# Patient Record
Sex: FEMALE | Race: WHITE | NOT HISPANIC OR LATINO | Employment: FULL TIME | URBAN - METROPOLITAN AREA
[De-identification: names, ages, dates, MRNs, and addresses within clinical notes are randomized per-mention and may not be internally consistent; named-entity substitution may affect disease eponyms.]

---

## 2017-01-25 ENCOUNTER — PATIENT MESSAGE (OUTPATIENT)
Dept: INTERNAL MEDICINE | Facility: CLINIC | Age: 47
End: 2017-01-25

## 2017-01-26 ENCOUNTER — PATIENT MESSAGE (OUTPATIENT)
Dept: INTERNAL MEDICINE | Facility: CLINIC | Age: 47
End: 2017-01-26

## 2017-01-26 DIAGNOSIS — Z00.00 ANNUAL PHYSICAL EXAM: Primary | ICD-10-CM

## 2017-01-31 ENCOUNTER — LAB VISIT (OUTPATIENT)
Dept: LAB | Facility: HOSPITAL | Age: 47
End: 2017-01-31
Payer: COMMERCIAL

## 2017-01-31 DIAGNOSIS — Z00.00 ANNUAL PHYSICAL EXAM: ICD-10-CM

## 2017-01-31 LAB
25(OH)D3+25(OH)D2 SERPL-MCNC: 72 NG/ML
ALBUMIN SERPL BCP-MCNC: 4 G/DL
ALP SERPL-CCNC: 46 U/L
ALT SERPL W/O P-5'-P-CCNC: 16 U/L
ANION GAP SERPL CALC-SCNC: 6 MMOL/L
AST SERPL-CCNC: 20 U/L
BASOPHILS # BLD AUTO: 0.04 K/UL
BASOPHILS NFR BLD: 1.3 %
BILIRUB SERPL-MCNC: 0.5 MG/DL
BUN SERPL-MCNC: 12 MG/DL
CALCIUM SERPL-MCNC: 9.5 MG/DL
CHLORIDE SERPL-SCNC: 106 MMOL/L
CHOLEST/HDLC SERPL: 4.1 {RATIO}
CO2 SERPL-SCNC: 26 MMOL/L
CREAT SERPL-MCNC: 0.7 MG/DL
DIFFERENTIAL METHOD: ABNORMAL
EOSINOPHIL # BLD AUTO: 0.1 K/UL
EOSINOPHIL NFR BLD: 2.2 %
ERYTHROCYTE [DISTWIDTH] IN BLOOD BY AUTOMATED COUNT: 12.8 %
EST. GFR  (AFRICAN AMERICAN): >60 ML/MIN/1.73 M^2
EST. GFR  (NON AFRICAN AMERICAN): >60 ML/MIN/1.73 M^2
GLUCOSE SERPL-MCNC: 82 MG/DL
HCT VFR BLD AUTO: 38.6 %
HDL/CHOLESTEROL RATIO: 24.6 %
HDLC SERPL-MCNC: 187 MG/DL
HDLC SERPL-MCNC: 46 MG/DL
HGB BLD-MCNC: 12.6 G/DL
LDLC SERPL CALC-MCNC: 131.2 MG/DL
LYMPHOCYTES # BLD AUTO: 1.2 K/UL
LYMPHOCYTES NFR BLD: 36.4 %
MCH RBC QN AUTO: 30.6 PG
MCHC RBC AUTO-ENTMCNC: 32.6 %
MCV RBC AUTO: 94 FL
MONOCYTES # BLD AUTO: 0.3 K/UL
MONOCYTES NFR BLD: 8.5 %
NEUTROPHILS # BLD AUTO: 1.6 K/UL
NEUTROPHILS NFR BLD: 51.6 %
NONHDLC SERPL-MCNC: 141 MG/DL
PLATELET # BLD AUTO: 192 K/UL
PMV BLD AUTO: 11.5 FL
POTASSIUM SERPL-SCNC: 4 MMOL/L
PROT SERPL-MCNC: 7.3 G/DL
RBC # BLD AUTO: 4.12 M/UL
SODIUM SERPL-SCNC: 138 MMOL/L
TRIGL SERPL-MCNC: 49 MG/DL
WBC # BLD AUTO: 3.16 K/UL

## 2017-01-31 PROCEDURE — 80061 LIPID PANEL: CPT

## 2017-01-31 PROCEDURE — 80053 COMPREHEN METABOLIC PANEL: CPT

## 2017-01-31 PROCEDURE — 85025 COMPLETE CBC W/AUTO DIFF WBC: CPT

## 2017-01-31 PROCEDURE — 82306 VITAMIN D 25 HYDROXY: CPT

## 2017-01-31 PROCEDURE — 36415 COLL VENOUS BLD VENIPUNCTURE: CPT

## 2017-02-07 ENCOUNTER — OFFICE VISIT (OUTPATIENT)
Dept: INTERNAL MEDICINE | Facility: CLINIC | Age: 47
End: 2017-02-07
Payer: COMMERCIAL

## 2017-02-07 VITALS
HEIGHT: 65 IN | HEART RATE: 73 BPM | WEIGHT: 115.75 LBS | DIASTOLIC BLOOD PRESSURE: 67 MMHG | SYSTOLIC BLOOD PRESSURE: 102 MMHG | BODY MASS INDEX: 19.28 KG/M2

## 2017-02-07 DIAGNOSIS — Z00.00 ANNUAL PHYSICAL EXAM: Primary | ICD-10-CM

## 2017-02-07 PROCEDURE — 99212 OFFICE O/P EST SF 10 MIN: CPT | Mod: S$GLB,,, | Performed by: INTERNAL MEDICINE

## 2017-02-07 PROCEDURE — 99999 PR PBB SHADOW E&M-EST. PATIENT-LVL II: CPT | Mod: PBBFAC,,, | Performed by: INTERNAL MEDICINE

## 2017-02-07 NOTE — PROGRESS NOTES
Dione Egan 46 y.o. female is here for office visit to review care and physical exam, I have personally taken the history and examined Doine Egan and agree with the findings, assessment and plan.

## 2017-02-07 NOTE — PROGRESS NOTES
Subjective:       Patient ID: Dione Egan is a 46 y.o. female.    Chief Complaint: Annual Exam     HPI     Patient is a 47 yo female who presents to clinic today for annual exam. During last visit patient had been having symptoms of depression 2/2 divorce. However she began having night sweats due to the medications and has been off of everything for the last 6 months. She states she is doing well without the medications. She is feeling well today with no complaints.     Health maintenance:   -hx of COURTNEY II. Has follow up with Dr. Kinney in July 2017.  -mammogram in 2015 was negative. She wishes to have mammograms every two years. Will order in 2018.  -Has history of familial hypercholesterolemia in her mother. Lipid panel from 1/31 showed LDL of 131 and HDL 46.     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 Recently . Does not smoke. Social drinker.     Family hx: Prostate CA in father. HLD in mother.     Past Surgical History   Procedure Laterality Date    Cervical con[facility]   w/ laser  2006     [facility]    Intrauterine device insertion  10/2015     [facility]    Cervical biopsy  w/ loop electrode excision  2016     [facility]---COURTNEY II       Review of Systems   Constitutional: Negative for chills, fatigue and fever.   HENT: Negative for facial swelling.    Eyes: Positive for photophobia. Negative for visual disturbance.   Respiratory: Negative for cough, shortness of breath and wheezing.    Cardiovascular: Negative for chest pain, palpitations and leg swelling.   Genitourinary: Negative for dysuria, hematuria and urgency.   Musculoskeletal: Negative for arthralgias and myalgias.   Skin: Negative for color change and pallor.   Allergic/Immunologic: Negative for immunocompromised state.   Neurological: Negative for dizziness, syncope, light-headedness and headaches.   Hematological: Negative for adenopathy. Does not bruise/bleed easily.   Psychiatric/Behavioral: Negative for agitation and behavioral problems. The  patient is not nervous/anxious.        Objective:      Physical Exam   Constitutional: She is oriented to person, place, and time. She appears well-developed and well-nourished.   HENT:   Head: Normocephalic and atraumatic.   Eyes: EOM are normal. Pupils are equal, round, and reactive to light.   Neck: Normal range of motion. Neck supple.   Cardiovascular: Normal rate and regular rhythm.  Exam reveals no friction rub.    No murmur heard.  Pulmonary/Chest: Effort normal and breath sounds normal. No respiratory distress. She has no wheezes.   Abdominal: Soft. She exhibits no distension. There is no tenderness.   Musculoskeletal: Normal range of motion. She exhibits no edema.   Neurological: She is alert and oriented to person, place, and time. No cranial nerve deficit.   Skin: Skin is warm and dry. No erythema.   Psychiatric: She has a normal mood and affect. Her behavior is normal.       Assessment:       1. Annual physical exam        Plan:       Dione was seen today for annual exam.       Annual physical exam  -hx of COURTNEY II. Has follow up with Dr. Kinney in July 2017.  -mammogram in 2015 was negative. She wishes to have mammograms every two years. Will order in 2018.  -Has history of familial hypercholesterolemia in her mother. Lipid panel from 1/31 showed LDL of 131 and HDL 46.     RTC in 1 year.     Discussed with Dr. Andrews.     Chelsea Diamond MD PGY-2

## 2017-05-15 ENCOUNTER — OFFICE VISIT (OUTPATIENT)
Dept: DERMATOLOGY | Facility: CLINIC | Age: 47
End: 2017-05-15
Payer: COMMERCIAL

## 2017-05-15 VITALS — BODY MASS INDEX: 19.14 KG/M2 | WEIGHT: 115 LBS

## 2017-05-15 DIAGNOSIS — D22.9 NEVUS OF MULTIPLE SITES: Primary | ICD-10-CM

## 2017-05-15 DIAGNOSIS — L21.9 SEBORRHEIC DERMATITIS: ICD-10-CM

## 2017-05-15 PROCEDURE — 99214 OFFICE O/P EST MOD 30 MIN: CPT | Mod: S$GLB,,, | Performed by: DERMATOLOGY

## 2017-05-15 PROCEDURE — 99999 PR PBB SHADOW E&M-EST. PATIENT-LVL II: CPT | Mod: PBBFAC,,, | Performed by: DERMATOLOGY

## 2017-05-15 NOTE — MR AVS SNAPSHOT
[facility]   [facility]  [place] [address]  Phone: [phone]  Fax: [phone]                  Dione Egan   5/15/2017 8:40 AM   Office Visit    Description:  Female : 1970   Provider:  Cynthia Ingram MD   Department:  [facility]           Reason for Visit     Spot     Skin Check           Diagnoses this Visit        Comments    Nevus of multiple sites    -  Primary     Seborrheic dermatitis                To Do List           Goals (5 Years of Data)     None      Follow-Up and Disposition     Return in about 1 year (around 5/15/2018).      [facility] On Call     [facility] On Call Nurse Care Line -  Assistance  Unless otherwise directed by your provider, please contact [facility], our nurse care line that is available for  assistance.     Registered nurses in the [facility] On Call Center provide: appointment scheduling, clinical advisement, health education, and other advisory services.  Call: [phone] (toll free)               Medications                Verify that the below list of medications is an accurate representation of the medications you are currently taking.  If none reported, the list may be blank. If incorrect, please contact your healthcare provider. Carry this list with you in case of emergency.           Current Medications     citalopram (CELEXA) 20 MG tablet Take 1 tablet (20 mg total) by mouth once daily.    escitalopram oxalate (LEXAPRO) 10 MG tablet Take 1 tablet (10 mg total) by mouth once daily.    tinidazole (TINDAMAX) 500 MG tablet 4 PILLS A DAY FOR 2 DAYS    trazodone (DESYREL) 50 MG tablet Take 1 tablet (50 mg total) by mouth every evening.    zolpidem (AMBIEN) 5 MG Tab Take 1 tablet (5 mg total) by mouth nightly as needed.           Clinical Reference Information           Your Vitals Were     Weight BMI             52.2 kg (115 lb) 19.14 kg/m2         Allergies as of 5/15/2017     Vicodin  [Hydrocodone-acetaminophen]      Immunizations Administered on Date of Encounter - 5/15/2017     None      Language Assistance Services     ATTENTION: Language assistance services are available, free of charge. Please call [phone].      ATENCIÓN: Si dionicio pan, tiene a servin disposición servicios gratuitos de asistencia lingüística. Llame al [phone].     [facility] Ý: N?u b?n nói Ti?ng Vi?t, có các d?ch v? h? tr? ngôn ng? mi?n phí dành cho b?n. G?i s? [phone].         [facility] complies with applicable Federal civil rights laws and does not discriminate on the basis of race, color, national origin, age, disability, or sex.

## 2017-05-15 NOTE — PROGRESS NOTES
"  Subjective:       Patient ID:  Dione Egan is a 46 y.o. female who presents for   Chief Complaint   Patient presents with    Spot    Skin Check     TBSE     HPI Comments: History of Present Illness: The patient presents with chief complaint of moles.  Location: total body check  Duration: years  Signs/Symptoms: none    Prior treatments: none    Also some pigment under left eye for over a year following picking at a lesion.  Her seborrheic dermatitis has improved (see previous note).      Spot         Review of Systems   Constitutional: Negative for fever.   Skin: Negative for itching and rash.   Hematologic/Lymphatic: Does not bruise/bleed easily.        Objective:    Physical Exam   Constitutional: She appears well-developed and well-nourished. No distress.   Neurological: She is alert and oriented to person, place, and time. She is not disoriented.   Psychiatric: She has a normal mood and affect.   Skin:   Areas Examined (abnormalities noted in diagram):   Scalp / Hair Palpated and Inspected  Head / Face Inspection Performed  Neck Inspection Performed  Chest / Axilla Inspection Performed  Abdomen Inspection Performed  Genitals / Buttocks / Groin Inspection Performed  Back Inspection Performed  RUE Inspected  LUE Inspection Performed  RLE Inspected  LLE Inspection Performed  Nails and Digits Inspection Performed                   Diagram Legend      Erythematous eczematous patch      See annotation      Assessment / Plan:        Nevus of multiple sites  Brochure provided  The "ABCD" rules to observe pigmented lesions were reviewed.      Seborrheic dermatitis  elidel cream prn    Post inflammatory hyperpigmentation  differin           Return in about 1 year (around 5/15/2018).  "

## 2018-11-14 ENCOUNTER — TELEPHONE (OUTPATIENT)
Dept: OPHTHALMOLOGY | Facility: CLINIC | Age: 48
End: 2018-11-14

## 2018-11-14 NOTE — TELEPHONE ENCOUNTER
----- Message from Mari Montero sent at 11/14/2018  9:36 AM CST -----  Contact: Dione Egan   Pt would like to speak with  nurse to please to scheduled appointment,pt can be reached at ext [phone] or cell# [phone] please thank you.

## 2018-12-17 ENCOUNTER — OFFICE VISIT (OUTPATIENT)
Dept: INTERNAL MEDICINE | Facility: CLINIC | Age: 48
End: 2018-12-17
Payer: COMMERCIAL

## 2018-12-17 VITALS
DIASTOLIC BLOOD PRESSURE: 72 MMHG | HEART RATE: 60 BPM | WEIGHT: 113.13 LBS | SYSTOLIC BLOOD PRESSURE: 112 MMHG | BODY MASS INDEX: 18.82 KG/M2

## 2018-12-17 DIAGNOSIS — Z00.00 ANNUAL PHYSICAL EXAM: Primary | ICD-10-CM

## 2018-12-17 DIAGNOSIS — E55.9 VITAMIN D DEFICIENCY: ICD-10-CM

## 2018-12-17 DIAGNOSIS — E78.01 FAMILIAL HYPERCHOLESTEREMIA: ICD-10-CM

## 2018-12-17 PROCEDURE — 3008F BODY MASS INDEX DOCD: CPT | Mod: CPTII,S$GLB,, | Performed by: INTERNAL MEDICINE

## 2018-12-17 PROCEDURE — 99213 OFFICE O/P EST LOW 20 MIN: CPT | Mod: S$GLB,,, | Performed by: INTERNAL MEDICINE

## 2018-12-17 PROCEDURE — 99999 PR PBB SHADOW E&M-EST. PATIENT-LVL III: CPT | Mod: PBBFAC,,, | Performed by: INTERNAL MEDICINE

## 2018-12-17 RX ORDER — ERGOCALCIFEROL (VITAMIN D2) 200 MCG/ML
2000 DROPS ORAL DAILY
COMMUNITY
End: 2022-06-27

## 2018-12-17 NOTE — PROGRESS NOTES
"Subjective:       Patient ID: Dione Egan is a 48 y.o. female.    Chief Complaint: No chief complaint on file.    HPI   48 year old woman with history of cervical dysplasia s/p colposcopy (2015) who presents to clinic for annual physical. She has been in general good state of health. She has taken 3000 units daily of vit D for the past 5 years for vit D deficiency. She has family history of high cholesterol and takes omega 3 and "cholestoff" supplements over the counter. Diet is mostly a mediterrean diet with several fruits and veggies. She does not eat pork or beef. She is physical active with daily walks.     She has not seen her ObGyn since 2016 but plans on making a follow-up appointment.     FH: prostate cancer (father), breast cancer (paternal aunt)  Social: No smoking, drinks 1 glass of wine every other day, no drug use; works as clinical research coordinator in oncology       Review of Systems   Constitutional: Negative for chills and fever.   HENT: Negative for sore throat.    Respiratory: Negative for shortness of breath.    Cardiovascular: Negative for chest pain.   Gastrointestinal: Negative for abdominal pain.   Genitourinary: Negative for dysuria.       Objective:      Physical Exam   Constitutional: She is oriented to person, place, and time. She appears well-developed and well-nourished.   HENT:   Right Ear: External ear normal.   Left Ear: External ear normal.   Eyes: EOM are normal.   Neck: Neck supple. No thyromegaly present.   Cardiovascular: Normal rate, regular rhythm, normal heart sounds and intact distal pulses.   Pulmonary/Chest: Effort normal and breath sounds normal.   Abdominal: Soft. Bowel sounds are normal. She exhibits no distension. There is no tenderness.   Musculoskeletal: She exhibits no edema.   Lymphadenopathy:     She has no cervical adenopathy.   Neurological: She is alert and oriented to person, place, and time.   Skin: Skin is warm and dry. Capillary refill takes less than 2 " seconds.       Assessment:       1. Annual physical exam    2. Familial hypercholesteremia    3. Vitamin D deficiency        Plan:       Diagnoses and all orders for this visit:    Annual physical exam  -     Lipid panel; Future  -     Mammo Digital Screening Bilateral With CAD; Future    Familial hypercholesteremia    Vitamin D deficiency    Continue Vit D2 2000 units daily.     RTC in 1 year. Plan discussed withy Dr. Taylor.    Reggie Eugene DO  PGY3 Internal Medicine  Pager: [phone]

## 2018-12-18 ENCOUNTER — HOSPITAL ENCOUNTER (OUTPATIENT)
Dept: RADIOLOGY | Facility: HOSPITAL | Age: 48
Discharge: HOME | End: 2018-12-18
Attending: INTERNAL MEDICINE
Payer: COMMERCIAL

## 2018-12-18 DIAGNOSIS — Z00.00 ANNUAL PHYSICAL EXAM: ICD-10-CM

## 2018-12-18 DIAGNOSIS — Z12.39 SCREENING FOR BREAST CANCER: ICD-10-CM

## 2018-12-18 PROCEDURE — 77063 BREAST TOMOSYNTHESIS BI: CPT | Mod: TC

## 2018-12-18 PROCEDURE — 77067 SCR MAMMO BI INCL CAD: CPT | Mod: TC

## 2018-12-18 PROCEDURE — 77067 SCR MAMMO BI INCL CAD: CPT | Mod: 26,,, | Performed by: RADIOLOGY

## 2018-12-18 PROCEDURE — 77063 BREAST TOMOSYNTHESIS BI: CPT | Mod: 26,,, | Performed by: RADIOLOGY

## 2018-12-19 ENCOUNTER — INITIAL CONSULT (OUTPATIENT)
Dept: OPHTHALMOLOGY | Facility: CLINIC | Age: 48
End: 2018-12-19
Payer: COMMERCIAL

## 2018-12-19 DIAGNOSIS — M35.01 KERATITIS SICCA: Primary | ICD-10-CM

## 2018-12-19 DIAGNOSIS — H52.03 HYPERMETROPIA OF BOTH EYES: ICD-10-CM

## 2018-12-19 DIAGNOSIS — H52.4 PRESBYOPIA: ICD-10-CM

## 2018-12-19 PROCEDURE — 99999 PR PBB SHADOW E&M-EST. PATIENT-LVL III: CPT | Mod: PBBFAC,,, | Performed by: OPHTHALMOLOGY

## 2018-12-19 PROCEDURE — 92012 INTRM OPH EXAM EST PATIENT: CPT | Mod: S$GLB,,, | Performed by: OPHTHALMOLOGY

## 2018-12-19 NOTE — PROGRESS NOTES
HPI     DLS: 02/05/2016 Dr. Burton    Patient states when she puts on OTC readers she is able to see st a   distance and near. She wants to know what her different options are but is   not interested in LASIK. Denies flashes, floaters, diplopia, photophobia,   glare, HA's, or pain.    Last edited by Yue Garcia on 12/19/2018  3:03 PM. (History)            Assessment /Plan     For exam results, see Encounter Report.    Keratitis sicca    Hypermetropia of both eyes    Presbyopia      ATs for ELKE  OTC readers OK
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
## 2019-12-20 ENCOUNTER — HOSPITAL ENCOUNTER (OUTPATIENT)
Dept: RADIOLOGY | Facility: HOSPITAL | Age: 49
Discharge: HOME | End: 2019-12-20
Attending: NURSE PRACTITIONER
Payer: COMMERCIAL
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
## 2019-12-20 PROCEDURE — 73130 X-RAY EXAM OF HAND: CPT | Mod: TC,LT

## 2019-12-20 PROCEDURE — 73130 XR HAND COMPLETE 3 VIEW LEFT: ICD-10-PCS | Mod: 26,LT,, | Performed by: RADIOLOGY

## 2019-12-20 PROCEDURE — 73130 X-RAY EXAM OF HAND: CPT | Mod: 26,LT,, | Performed by: RADIOLOGY

## 2020-03-10 ENCOUNTER — OFFICE VISIT (OUTPATIENT)
Dept: OBSTETRICS AND GYNECOLOGY | Facility: CLINIC | Age: 50
End: 2020-03-10
Payer: COMMERCIAL

## 2020-03-10 VITALS — WEIGHT: 112 LBS | HEIGHT: 65 IN | BODY MASS INDEX: 18.66 KG/M2

## 2020-03-10 DIAGNOSIS — N88.2 CERVICAL OS STENOSIS: ICD-10-CM

## 2020-03-10 DIAGNOSIS — Z12.31 SCREENING MAMMOGRAM, ENCOUNTER FOR: ICD-10-CM

## 2020-03-10 DIAGNOSIS — Z01.419 ENCOUNTER FOR GYNECOLOGICAL EXAMINATION WITHOUT ABNORMAL FINDING: Primary | ICD-10-CM

## 2020-03-10 PROBLEM — Z30.432 ENCOUNTER FOR IUD REMOVAL: Status: ACTIVE | Noted: 2020-03-10

## 2020-03-10 PROBLEM — Z30.432 ENCOUNTER FOR IUD REMOVAL: Status: RESOLVED | Noted: 2020-03-10 | Resolved: 2020-03-10

## 2020-03-10 PROCEDURE — 99999 PR PBB SHADOW E&M-EST. PATIENT-LVL III: CPT | Mod: PBBFAC,,, | Performed by: OBSTETRICS & GYNECOLOGY

## 2020-03-10 PROCEDURE — 99386 PREV VISIT NEW AGE 40-64: CPT | Mod: S$GLB,,, | Performed by: OBSTETRICS & GYNECOLOGY

## 2020-03-10 PROCEDURE — 99386 PR PREVENTIVE VISIT,NEW,40-64: ICD-10-PCS | Mod: S$GLB,,, | Performed by: OBSTETRICS & GYNECOLOGY

## 2020-03-10 PROCEDURE — 99999 PR PBB SHADOW E&M-EST. PATIENT-LVL III: ICD-10-PCS | Mod: PBBFAC,,, | Performed by: OBSTETRICS & GYNECOLOGY

## 2020-03-10 PROCEDURE — 88175 CYTOPATH C/V AUTO FLUID REDO: CPT

## 2020-03-10 RX ORDER — MISOPROSTOL 200 UG/1
TABLET ORAL
Qty: 3 TABLET | Refills: 0 | Status: SHIPPED | OUTPATIENT
Start: 2020-03-10 | End: 2020-05-26 | Stop reason: SDUPTHER

## 2020-03-10 NOTE — PROGRESS NOTES
03/13/20 U/S  Uterus:  Size: 9.4 x 4.1 x 5.7 cm  Masses: None  Endometrium: Normal in this pre menopausal patient, measuring 2 mm.  Endometrial canal is not distended.  Intrauterine device is in good position.  Right ovary:  Size: 3.4 x 1.8 x 2.4 cm  Appearance: Normal  Vascular flow: Normal.  Left ovary:  Size: 3.5 x 2.1 x 3.2 cm  Appearance: A nonspecific focal area of hyperechogenicity is identified in the left ovary, measuring 0.9 x 0.6 x 0.7 cm.  Vascular Flow: Normal.  Free Fluid:  None.  Others:  Nabothian cysts are identified at the cervix.  Intrauterine device is in satisfactory position.      Impression     1. Subcentimeter hyperechoic lesion in the left ovary, possibly a small dermoid cyst.  Consider correlation with any outside imaging if available.  2. Intrauterine device is in appropriate position.  3. Nabothian cysts are identified at the cervix.         PT HERE FOR ANNUAL.  WANTS IUD REMOVED.    ROS:  GENERAL: No fever, chills, fatigability or weight loss.  VULVAR: No pain, no lesions and no itching.  VAGINAL: No relaxation, no itching, no discharge, no abnormal bleeding and no lesions.  ABDOMEN: No abdominal pain. Denies nausea. Denies vomiting. No diarrhea. No constipation  BREAST: Denies pain. No lumps. No discharge.  URINARY: No incontinence, no nocturia, no frequency and no dysuria.  CARDIOVASCULAR: No chest pain. No shortness of breath. No leg cramps.  NEUROLOGICAL: No headaches. No vision changes.  The remainder of the review of systems was negative.  Answers for HPI/ROS submitted by the patient on 3/10/2020   Gynecologic exam  genital itching: No  genital lesions: No  genital odor: No  genital rash: No  missed menses: No  pelvic pain: No  vaginal bleeding: No  vaginal discharge: No  Pregnant now?: No  abdominal pain: No  anorexia: No  back pain: No  chills: No  constipation: No  diarrhea: No  discolored urine: No  dysuria: No  fever: No  flank pain: No  frequency: No  headaches:  No  hematuria: No  nausea: No  painful intercourse: No  rash: No  urgency: No  vomiting: No  Vaginal bleeding: no bleeding  Passing clots?: No  Passing tissue?: No  Sexual activity: sexually active  Partner with STD symptoms: no  Birth control: an IUD  Menstrual history: regular  STD: No  abdominal surgery: No   section: No  Ectopic pregnancy: No  Endometriosis: No  herpes simplex: No  gynecological surgery: No  menorrhagia: No  metrorrhagia: No  miscarriage: Yes  ovarian cysts: No  perineal abscess: No  PID: No  terminated pregnancy: No  vaginosis: No    PE:  General Appearance: normal weight And Well developed. Well nourished. In no acute distress.  Vulva: Lesions: No.  Urethral Meatus: Normal size. Normal location. No lesions. No prolapse.  Urethra: No masses. No tenderness. No prolapse. No scarring.  Bladder: No masses. No tenderness.  Vagina: Mucosa NI:yes discharge no, atrophy no, cystocele no or rectocele no.  Cervix: Lesion: no  StenoticYES Cervical motion tenderness: no  Uterus: Uterus size: 6 weeks. Support good. Uterus size: Normal  Adnexa: Masses: No Tenderness: No CDS Nodularity: No  Abdomen: normal weight No masses. No tenderness.  Breasts: No bilateral masses. No bilateral discharge. No bilateral tenderness. No bilateral fibrocystic changes.  Neck: No thyroid enlargement. No thyroid masses.  Skin: Rashes: No      PROCEDURES:    PLAN:     DIAGNOSIS:  1. Encounter for gynecological examination without abnormal finding    2. Cervical os stenosis    3. Screening mammogram, encounter for        MEDICATIONS & ORDERS:  Orders Placed This Encounter    Mammo Digital Screening Bilat    US Pelvis Comp with Transvag NON-OB (xpd    Liquid-Based Pap Smear, Screening    miSOPROStoL (CYTOTEC) 200 MCG Tab       Patient was counseled today on the new ACS guidelines for cervical cytology screening as well as the current recommendations for breast cancer screening. She was counseled to follow up with her PCP  for other routine health maintenance. Counseling session lasted approximately 10 minutes, and all her questions were answered.         FOLLOW-UP: With me in IUD REMOVAL.

## 2020-03-13 ENCOUNTER — HOSPITAL ENCOUNTER (OUTPATIENT)
Dept: RADIOLOGY | Facility: HOSPITAL | Age: 50
Discharge: HOME | End: 2020-03-13
Attending: OBSTETRICS & GYNECOLOGY
Payer: COMMERCIAL

## 2020-03-13 DIAGNOSIS — N88.2 CERVICAL OS STENOSIS: ICD-10-CM

## 2020-03-13 PROCEDURE — 76830 TRANSVAGINAL US NON-OB: CPT | Mod: TC

## 2020-03-13 PROCEDURE — 76856 US PELVIS COMPLETE WITH TRANSVAG FOR IUD: ICD-10-PCS | Mod: 26,,, | Performed by: RADIOLOGY

## 2020-03-13 PROCEDURE — 76856 US EXAM PELVIC COMPLETE: CPT | Mod: 26,,, | Performed by: RADIOLOGY

## 2020-03-13 PROCEDURE — 76830 US PELVIS COMPLETE WITH TRANSVAG FOR IUD: ICD-10-PCS | Mod: 26,,, | Performed by: RADIOLOGY

## 2020-03-13 PROCEDURE — 76830 TRANSVAGINAL US NON-OB: CPT | Mod: 26,,, | Performed by: RADIOLOGY

## 2020-04-02 LAB
FINAL PATHOLOGIC DIAGNOSIS: NORMAL
Lab: NORMAL

## 2020-04-21 DIAGNOSIS — Z01.84 ANTIBODY RESPONSE EXAMINATION: ICD-10-CM

## 2020-04-23 ENCOUNTER — LAB VISIT (OUTPATIENT)
Dept: LAB | Facility: HOSPITAL | Age: 50
End: 2020-04-23
Attending: INTERNAL MEDICINE
Payer: COMMERCIAL

## 2020-04-23 DIAGNOSIS — Z01.84 ANTIBODY RESPONSE EXAMINATION: ICD-10-CM

## 2020-04-23 LAB — SARS-COV-2 IGG SERPL QL IA: NEGATIVE

## 2020-04-23 PROCEDURE — 36415 COLL VENOUS BLD VENIPUNCTURE: CPT | Mod: PO

## 2020-04-23 PROCEDURE — 86769 SARS-COV-2 COVID-19 ANTIBODY: CPT

## 2020-05-25 ENCOUNTER — PROCEDURE VISIT (OUTPATIENT)
Dept: OBSTETRICS AND GYNECOLOGY | Facility: CLINIC | Age: 50
End: 2020-05-25
Payer: COMMERCIAL

## 2020-05-25 VITALS
DIASTOLIC BLOOD PRESSURE: 76 MMHG | WEIGHT: 112.88 LBS | HEIGHT: 65 IN | BODY MASS INDEX: 18.81 KG/M2 | SYSTOLIC BLOOD PRESSURE: 94 MMHG

## 2020-05-25 DIAGNOSIS — T83.32XD INTRAUTERINE CONTRACEPTIVE DEVICE THREADS LOST, SUBSEQUENT ENCOUNTER: Primary | ICD-10-CM

## 2020-05-25 PROCEDURE — 58301 REMOVE INTRAUTERINE DEVICE: CPT | Mod: S$GLB,,, | Performed by: OBSTETRICS & GYNECOLOGY

## 2020-05-25 PROCEDURE — 58301 REMOVAL OF INTRAUTERINE DEVICE: ICD-10-PCS | Mod: S$GLB,,, | Performed by: OBSTETRICS & GYNECOLOGY

## 2020-05-25 NOTE — PROCEDURES
Removal of Intrauterine Device  Date/Time: 2020 3:14 PM  Performed by: Daljit Kinney Jr., MD  Authorized by: Daljit Kinney Jr., MD   Unsuccessful attempt  Local anesthesia used: no    Anesthesia:  Local anesthesia used: no    Sedation:  Patient sedated: no    Patient tolerance: Patient tolerated the procedure well with no immediate complications      .  Dione Egan is a 49 y.o. female  presents for IUD removal because time.      PRE-IUD REMOVAL COUNSELING:  The patient was advised of minimal risks of bleeding and pain and she agrees to proceed.    PROCEDURE:  TIME OUT PERFORMED.  IUD strings were not  visualized at the os and grasped. #11 BLADE. UTERINE DRESSING FORCEPS. IUD NOT removed with gentle traction.  The patient tolerated the procedure well.    ASSESSMENT:  Contraceptive Management / Removal IUD. V25.0.    POST IUD REMOVAL COUNSELING:  Expect period-like flow to occur after Mirena IUD removal and periods to return to pre-IUD pattern.  Manage post IUD removal cramping with NSAIDs, Tylenol or Rx per [facility].    POST IUD REMOVAL CONTRACEPTION:  If planning pregnancy, RX for prenatal vitamins.    Counseling lasted approximately 15 minutes and all her questions were answered.    FOLLOW-UP: OFFICE HYSTEROSCOPY IUD REMOVAL

## 2020-05-26 RX ORDER — MISOPROSTOL 200 UG/1
TABLET ORAL
Qty: 3 TABLET | Refills: 0 | Status: SHIPPED | OUTPATIENT
Start: 2020-05-26 | End: 2020-06-29 | Stop reason: SDUPTHER

## 2020-05-28 ENCOUNTER — TELEPHONE (OUTPATIENT)
Dept: OBSTETRICS AND GYNECOLOGY | Facility: CLINIC | Age: 50
End: 2020-05-28

## 2020-05-28 NOTE — TELEPHONE ENCOUNTER
Patient has been scheduled for her in office hysteroscope. Patient stated that she will call the office on Monday because 6/4 is not a good day for her. She is leaving for vacation.

## 2020-05-28 NOTE — TELEPHONE ENCOUNTER
----- Message from Asif Means sent at 5/28/2020 12:14 PM CDT -----  Contact: LASHAE WHITT [[id number]]  Name of Who is Calling: LASHAE WHITT [[id number]]    What is the request in detail: LASHAE WHITT [[id number]] is calling in regards to procedure ... Please contact to further discuss and advise      Can the clinic reply by [facility]: yes     What Number to Call Back if not in [facility]:  [phone] (home)

## 2020-06-01 ENCOUNTER — TELEPHONE (OUTPATIENT)
Dept: OBSTETRICS AND GYNECOLOGY | Facility: CLINIC | Age: 50
End: 2020-06-01

## 2020-06-01 NOTE — TELEPHONE ENCOUNTER
Spoke with pt was able to get her rescheduled for procedure. Pt voiced understanding with no questions.

## 2020-06-01 NOTE — TELEPHONE ENCOUNTER
----- Message from Daljit Kinney Jr., MD sent at 6/1/2020 10:33 AM CDT -----  Contact: Patient   CALL PT TO R/S APPT.  ----- Message -----  From: Satya Cristobal  Sent: 6/1/2020   9:32 AM CDT  To: Daljit Kinney Jr., MD    Patient called in to Rs appt can not make that time would like to speak w/ staff in regards to appt arrangements. Patient contact number [phone].

## 2020-06-29 NOTE — TELEPHONE ENCOUNTER
----- Message from Pat Urban sent at 6/29/2020 10:30 AM CDT -----  Pt is requesting a call back to discuss upcoming appt.    Pt can be reached at- [phone]

## 2020-06-30 RX ORDER — MISOPROSTOL 200 UG/1
TABLET ORAL
Qty: 1 TABLET | Refills: 0 | Status: SHIPPED | OUTPATIENT
Start: 2020-06-30 | End: 2020-07-21

## 2020-07-01 ENCOUNTER — PROCEDURE VISIT (OUTPATIENT)
Dept: OBSTETRICS AND GYNECOLOGY | Facility: CLINIC | Age: 50
End: 2020-07-01
Payer: COMMERCIAL

## 2020-07-01 VITALS
WEIGHT: 113.13 LBS | DIASTOLIC BLOOD PRESSURE: 61 MMHG | HEIGHT: 63 IN | BODY MASS INDEX: 20.04 KG/M2 | SYSTOLIC BLOOD PRESSURE: 106 MMHG

## 2020-07-01 DIAGNOSIS — T83.32XD INTRAUTERINE CONTRACEPTIVE DEVICE THREADS LOST, SUBSEQUENT ENCOUNTER: Primary | ICD-10-CM

## 2020-07-01 PROCEDURE — 58558 PR HYSTEROSCOPY,W/ENDO BX: ICD-10-PCS | Mod: S$GLB,,, | Performed by: OBSTETRICS & GYNECOLOGY

## 2020-07-01 PROCEDURE — 58558 HYSTEROSCOPY BIOPSY: CPT | Mod: S$GLB,,, | Performed by: OBSTETRICS & GYNECOLOGY

## 2020-07-01 NOTE — PROCEDURES
Hysteroscopy-Today    Date/Time: 7/1/2020 2:45 PM  Performed by: Daljit Kinney Jr., MD  Authorized by: Daljit Kinney Jr., MD   Preparation: Patient was prepped and draped in the usual sterile fashion.  Local anesthesia used: yes    Anesthesia:  Local anesthesia used: yes  Local Anesthetic: topical anesthetic  Anesthetic total: 3 mL    Sedation:  Patient sedated: no    Patient tolerance: patient tolerated the procedure well with no immediate complications          After obtaining informed consent the patient taken to the examination room and was placed in stirrups.  A Graves speculum placed in the vagina and retained.  Cervix was noted single-tooth tenaculum placed on anterior lip of the cervix.  11.  Blade was used to incise some skin over the external os.  The office hysteroscope was then introduced into the external surface cervix and into the uterine cavity.  Cavity appeared normal with proliferative endometrium.  The IUD was noted to be at the fundus not imbedded in the muscle.  IUD string was noted was grasped with a grasper and the IUD was removed from the uterus intact.  Patient tolerated procedure well

## 2020-07-20 ENCOUNTER — TELEPHONE (OUTPATIENT)
Dept: OBSTETRICS AND GYNECOLOGY | Facility: CLINIC | Age: 50
End: 2020-07-20

## 2020-07-20 NOTE — TELEPHONE ENCOUNTER
----- Message from Frieda Stephenson sent at 7/20/2020  8:12 AM CDT -----  Regarding: Patient Advices  Name of Who is Calling:  Dione Egan      What is the request in detail:  Patient called requesting to schedule to have her IUD placed.  Please give a call back at your earliest convenience and further advise.    THANKS!    Reply by [facility]: no     Call Back [phone]

## 2020-07-21 ENCOUNTER — TELEPHONE (OUTPATIENT)
Dept: OBSTETRICS AND GYNECOLOGY | Facility: CLINIC | Age: 50
End: 2020-07-21

## 2020-07-21 DIAGNOSIS — Z30.014 ENCOUNTER FOR INITIAL PRESCRIPTION OF INTRAUTERINE CONTRACEPTIVE DEVICE (IUD): Primary | ICD-10-CM

## 2020-07-21 RX ORDER — MISOPROSTOL 200 UG/1
TABLET ORAL
Qty: 3 TABLET | Refills: 0 | Status: SHIPPED | OUTPATIENT
Start: 2020-07-21 | End: 2020-08-24

## 2020-08-19 ENCOUNTER — OFFICE VISIT (OUTPATIENT)
Dept: OPTOMETRY | Facility: CLINIC | Age: 50
End: 2020-08-19
Payer: COMMERCIAL

## 2020-08-19 DIAGNOSIS — H52.03 HYPEROPIA WITH PRESBYOPIA OF BOTH EYES: Primary | ICD-10-CM

## 2020-08-19 DIAGNOSIS — H52.4 HYPEROPIA WITH PRESBYOPIA OF BOTH EYES: Primary | ICD-10-CM

## 2020-08-19 PROCEDURE — 92015 DETERMINE REFRACTIVE STATE: CPT | Mod: S$GLB,,, | Performed by: OPTOMETRIST

## 2020-08-19 PROCEDURE — 92015 PR REFRACTION: ICD-10-PCS | Mod: S$GLB,,, | Performed by: OPTOMETRIST

## 2020-08-19 PROCEDURE — 92004 COMPRE OPH EXAM NEW PT 1/>: CPT | Mod: S$GLB,,, | Performed by: OPTOMETRIST

## 2020-08-19 PROCEDURE — 99999 PR PBB SHADOW E&M-EST. PATIENT-LVL II: ICD-10-PCS | Mod: PBBFAC,,, | Performed by: OPTOMETRIST

## 2020-08-19 PROCEDURE — 99999 PR PBB SHADOW E&M-EST. PATIENT-LVL II: CPT | Mod: PBBFAC,,, | Performed by: OPTOMETRIST

## 2020-08-19 PROCEDURE — 92004 PR EYE EXAM, NEW PATIENT,COMPREHESV: ICD-10-PCS | Mod: S$GLB,,, | Performed by: OPTOMETRIST

## 2020-08-19 PROCEDURE — 92310 PR CONTACT LENS FITTING (NO CHANGE): ICD-10-PCS | Mod: CSM,S$GLB,, | Performed by: OPTOMETRIST

## 2020-08-19 PROCEDURE — 92310 CONTACT LENS FITTING OU: CPT | Mod: CSM,S$GLB,, | Performed by: OPTOMETRIST

## 2020-08-19 NOTE — PROGRESS NOTES
HPI     Annual [facility] Vision Exam  Blur at distance and near, using OTC readers for both  Interested in alternatives to sRx     Last edited by Osvaldo Burton, OD on 8/19/2020 12:55 PM. (History)            Assessment /Plan     For exam results, see Encounter Report.    Hyperopia with presbyopia of both eyes  Eyeglass Final Rx     Eyeglass Final Rx       Sphere Cylinder Dist VA Add    Right +2.25 Sphere 20/20 +1.50    Left +2.25 Sphere 20/20 +1.50    Type: PAL    Expiration Date: 8/20/2021              Eyeglass Final Rx     Eyeglass Final Rx       Sphere Cylinder Dist VA Add    Right +2.25 Sphere 20/20 +1.50    Left +2.25 Sphere 20/20 +1.50    Type: PAL    Expiration Date: 8/20/2021              [facility]  Train CARTER Zarate/Demarcus FERNANDEZC 1 yr, 1 wk PHREV

## 2020-08-24 ENCOUNTER — PROCEDURE VISIT (OUTPATIENT)
Dept: OBSTETRICS AND GYNECOLOGY | Facility: CLINIC | Age: 50
End: 2020-08-24
Payer: COMMERCIAL

## 2020-08-24 VITALS
SYSTOLIC BLOOD PRESSURE: 118 MMHG | HEIGHT: 63 IN | WEIGHT: 111.75 LBS | BODY MASS INDEX: 19.8 KG/M2 | DIASTOLIC BLOOD PRESSURE: 64 MMHG

## 2020-08-24 DIAGNOSIS — Z30.430 ENCOUNTER FOR IUD INSERTION: Primary | ICD-10-CM

## 2020-08-24 LAB
B-HCG UR QL: NEGATIVE
CTP QC/QA: YES

## 2020-08-24 PROCEDURE — 58300 INSERT INTRAUTERINE DEVICE: CPT | Mod: S$GLB,,, | Performed by: OBSTETRICS & GYNECOLOGY

## 2020-08-24 PROCEDURE — 58300 INSERTION OF IUD: ICD-10-PCS | Mod: S$GLB,,, | Performed by: OBSTETRICS & GYNECOLOGY

## 2020-08-24 NOTE — PROCEDURES
Insertion of IUD    Date/Time: 8/24/2020 1:00 PM  Performed by: Daljit Kinney Jr., MD  Authorized by: Daljit Kinney Jr., MD     Consent:     Consent obtained:  Written    Consent given by:  Patient    Procedure risks and benefits discussed: yes      Patient questions answered: yes      Patient agrees, verbalizes understanding, and wants to proceed: yes      Educational handouts given: yes      Instructions and paperwork completed: yes    Procedure:     Pelvic exam performed: yes      Negative GC/chlamydia test: no      Negative urine pregnancy test: yes      Negative serum pregnancy test: no      Cervix cleaned and prepped: no      Speculum placed in vagina: yes      Tenaculum applied to cervix: yes      Uterus sounded: yes      Uterus sound depth (cm):  7    IUD inserted with no complications: yes      IUD type:  Mirena    Strings trimmed: yes    Post-procedure:     Patient tolerated procedure well: yes      Patient will follow up after next period: no

## 2020-08-26 ENCOUNTER — TELEPHONE (OUTPATIENT)
Dept: OPTOMETRY | Facility: CLINIC | Age: 50
End: 2020-08-26

## 2020-09-18 ENCOUNTER — OFFICE VISIT (OUTPATIENT)
Dept: OPTOMETRY | Facility: CLINIC | Age: 50
End: 2020-09-18
Payer: COMMERCIAL

## 2020-09-18 DIAGNOSIS — H52.4 HYPEROPIA WITH PRESBYOPIA OF BOTH EYES: Primary | ICD-10-CM

## 2020-09-18 DIAGNOSIS — H52.03 HYPEROPIA WITH PRESBYOPIA OF BOTH EYES: Primary | ICD-10-CM

## 2020-09-18 PROCEDURE — 92499 UNLISTED OPH SVC/PROCEDURE: CPT | Mod: S$GLB,,, | Performed by: OPTOMETRIST

## 2020-09-18 PROCEDURE — 99499 NO LOS: ICD-10-PCS | Mod: S$GLB,,, | Performed by: OPTOMETRIST

## 2020-09-18 PROCEDURE — 99999 PR PBB SHADOW E&M-EST. PATIENT-LVL II: ICD-10-PCS | Mod: PBBFAC,,, | Performed by: OPTOMETRIST

## 2020-09-18 PROCEDURE — 99499 UNLISTED E&M SERVICE: CPT | Mod: S$GLB,,, | Performed by: OPTOMETRIST

## 2020-09-18 PROCEDURE — 99999 PR PBB SHADOW E&M-EST. PATIENT-LVL II: CPT | Mod: PBBFAC,,, | Performed by: OPTOMETRIST

## 2020-09-18 PROCEDURE — 92499 PR CONTACT LENS F/U LEV 1: ICD-10-PCS | Mod: S$GLB,,, | Performed by: OPTOMETRIST

## 2020-09-18 NOTE — PROGRESS NOTES
HPI     New CLs.  Wants less expensive brand and Different vision    Last edited by Osvaldo Burton, OD on 9/18/2020  9:12 AM. (History)            Assessment /Plan     For exam results, see Encounter Report.    Hyperopia with presbyopia of both eyes  -Trial Ultra ok to final preferred  -reviewed monthly lns care, puremoist soilution w/ rub prior to overnight soak    RTC 1 wk PHREV

## 2020-09-21 ENCOUNTER — PATIENT MESSAGE (OUTPATIENT)
Dept: OPTOMETRY | Facility: CLINIC | Age: 50
End: 2020-09-21

## 2020-09-30 ENCOUNTER — OFFICE VISIT (OUTPATIENT)
Dept: OPTOMETRY | Facility: CLINIC | Age: 50
End: 2020-09-30
Payer: COMMERCIAL

## 2020-09-30 DIAGNOSIS — H52.03 HYPEROPIA WITH PRESBYOPIA OF BOTH EYES: Primary | ICD-10-CM

## 2020-09-30 DIAGNOSIS — H52.4 HYPEROPIA WITH PRESBYOPIA OF BOTH EYES: Primary | ICD-10-CM

## 2020-09-30 PROCEDURE — 99499 UNLISTED E&M SERVICE: CPT | Mod: S$GLB,,, | Performed by: OPTOMETRIST

## 2020-09-30 PROCEDURE — 92499 PR CONTACT LENS F/U LEV 1: ICD-10-PCS | Mod: S$GLB,,, | Performed by: OPTOMETRIST

## 2020-09-30 PROCEDURE — 92499 UNLISTED OPH SVC/PROCEDURE: CPT | Mod: S$GLB,,, | Performed by: OPTOMETRIST

## 2020-09-30 PROCEDURE — 99499 NO LOS: ICD-10-PCS | Mod: S$GLB,,, | Performed by: OPTOMETRIST

## 2020-09-30 NOTE — PROGRESS NOTES
HPI     Lens may feel lose occasionally  Using Blink to help with dryness   Rare headaches      Last edited by Osvaldo Burton, OD on 9/30/2020 11:18 AM. (History)            Assessment /Plan     For exam results, see Encounter Report.    Hyperopia with presbyopia of both eyes  -Dispensed trial #3  -Ok to final preferred      RTC 1 wk PHREV

## 2020-10-05 ENCOUNTER — TELEPHONE (OUTPATIENT)
Dept: OPTOMETRY | Facility: CLINIC | Age: 50
End: 2020-10-05

## 2020-10-05 NOTE — TELEPHONE ENCOUNTER
----- Message from Osvaldo Burton OD sent at 9/30/2020 11:41 AM CDT -----  Regarding: PHREV 10/5  Call for final

## 2020-10-12 ENCOUNTER — PATIENT MESSAGE (OUTPATIENT)
Dept: OPTOMETRY | Facility: CLINIC | Age: 50
End: 2020-10-12

## 2020-10-13 ENCOUNTER — TELEPHONE (OUTPATIENT)
Dept: OPTOMETRY | Facility: CLINIC | Age: 50
End: 2020-10-13

## 2020-10-13 NOTE — TELEPHONE ENCOUNTER
Final  Contact Lens Final Rx     Final Contact Lens Rx       Brand Sphere Cylinder Add    Right [employer] Ultra for Presbyopia +2.50 Sphere Low    Left [employer] Ultra for Presbyopia +2.50 Sphere Low    Expiration Date: 10/14/2021    Replacement: Monthly    Solutions: OptiFree PureMoist    Wearing Schedule: Daily wear

## 2020-11-25 ENCOUNTER — TELEPHONE (OUTPATIENT)
Dept: INTERNAL MEDICINE | Facility: CLINIC | Age: 50
End: 2020-11-25
Payer: COMMERCIAL

## 2020-11-25 NOTE — TELEPHONE ENCOUNTER
"----- Message from RENETTA Agee sent at 11/25/2020 11:25 AM CST -----  Regarding: Vitural for Wellness visit  Need to call and cancel "Virtual" appt for next Tuesday.  Unfortunately, virtual visit is unable to be done for an "Annual Wellness". Will need to come into clinic.     Thanks.     "

## 2021-01-08 ENCOUNTER — IMMUNIZATION (OUTPATIENT)
Dept: INTERNAL MEDICINE | Facility: CLINIC | Age: 51
End: 2021-01-08
Payer: COMMERCIAL

## 2021-01-08 DIAGNOSIS — Z23 NEED FOR VACCINATION: ICD-10-CM

## 2021-01-08 PROCEDURE — 91300 COVID-19, MRNA, LNP-S, PF, 30 MCG/0.3 ML DOSE VACCINE: CPT | Mod: PBBFAC | Performed by: INTERNAL MEDICINE

## 2021-01-29 ENCOUNTER — IMMUNIZATION (OUTPATIENT)
Dept: INTERNAL MEDICINE | Facility: CLINIC | Age: 51
End: 2021-01-29
Payer: COMMERCIAL

## 2021-01-29 DIAGNOSIS — Z23 NEED FOR VACCINATION: Primary | ICD-10-CM

## 2021-01-29 PROCEDURE — 0002A COVID-19, MRNA, LNP-S, PF, 30 MCG/0.3 ML DOSE VACCINE: CPT | Mod: PBBFAC | Performed by: INTERNAL MEDICINE

## 2021-01-29 PROCEDURE — 91300 COVID-19, MRNA, LNP-S, PF, 30 MCG/0.3 ML DOSE VACCINE: CPT | Mod: PBBFAC | Performed by: INTERNAL MEDICINE

## 2021-03-09 RX ORDER — OFLOXACIN 3 MG/ML
1 SOLUTION/ DROPS OPHTHALMIC
Qty: 10 ML | Refills: 2 | Status: SHIPPED | OUTPATIENT
Start: 2021-03-09 | End: 2021-03-16

## 2021-03-17 ENCOUNTER — OFFICE VISIT (OUTPATIENT)
Dept: OPHTHALMOLOGY | Facility: CLINIC | Age: 51
End: 2021-03-17
Payer: COMMERCIAL

## 2021-03-17 DIAGNOSIS — H16.002 ULCER OF LEFT CORNEA: Primary | ICD-10-CM

## 2021-03-17 PROCEDURE — 99999 PR PBB SHADOW E&M-EST. PATIENT-LVL II: CPT | Mod: PBBFAC,,, | Performed by: OPHTHALMOLOGY

## 2021-03-17 PROCEDURE — 87102 FUNGUS ISOLATION CULTURE: CPT | Performed by: OPHTHALMOLOGY

## 2021-03-17 PROCEDURE — 92014 PR EYE EXAM, EST PATIENT,COMPREHESV: ICD-10-PCS | Mod: S$GLB,,, | Performed by: OPHTHALMOLOGY

## 2021-03-17 PROCEDURE — 87070 CULTURE OTHR SPECIMN AEROBIC: CPT | Performed by: OPHTHALMOLOGY

## 2021-03-17 PROCEDURE — 92014 COMPRE OPH EXAM EST PT 1/>: CPT | Mod: S$GLB,,, | Performed by: OPHTHALMOLOGY

## 2021-03-17 PROCEDURE — 99999 PR PBB SHADOW E&M-EST. PATIENT-LVL II: ICD-10-PCS | Mod: PBBFAC,,, | Performed by: OPHTHALMOLOGY

## 2021-03-17 RX ORDER — POLYMYXIN B SULFATE AND TRIMETHOPRIM 1; 10000 MG/ML; [USP'U]/ML
1 SOLUTION OPHTHALMIC EVERY 4 HOURS
Qty: 10 ML | Refills: 3 | Status: SHIPPED | OUTPATIENT
Start: 2021-03-17 | End: 2021-04-19

## 2021-03-18 ENCOUNTER — PATIENT MESSAGE (OUTPATIENT)
Dept: RESEARCH | Facility: HOSPITAL | Age: 51
End: 2021-03-18

## 2021-03-20 LAB — BACTERIA SPEC AEROBE CULT: NO GROWTH

## 2021-03-24 ENCOUNTER — OFFICE VISIT (OUTPATIENT)
Dept: OPHTHALMOLOGY | Facility: CLINIC | Age: 51
End: 2021-03-24
Payer: COMMERCIAL

## 2021-03-24 DIAGNOSIS — H16.002 ULCER OF LEFT CORNEA: Primary | ICD-10-CM

## 2021-03-24 PROCEDURE — 1126F PR PAIN SEVERITY QUANTIFIED, NO PAIN PRESENT: ICD-10-PCS | Mod: S$GLB,,, | Performed by: OPHTHALMOLOGY

## 2021-03-24 PROCEDURE — 1126F AMNT PAIN NOTED NONE PRSNT: CPT | Mod: S$GLB,,, | Performed by: OPHTHALMOLOGY

## 2021-03-24 PROCEDURE — 92014 PR EYE EXAM, EST PATIENT,COMPREHESV: ICD-10-PCS | Mod: S$GLB,,, | Performed by: OPHTHALMOLOGY

## 2021-03-24 PROCEDURE — 92014 COMPRE OPH EXAM EST PT 1/>: CPT | Mod: S$GLB,,, | Performed by: OPHTHALMOLOGY

## 2021-03-24 PROCEDURE — 99999 PR PBB SHADOW E&M-EST. PATIENT-LVL II: ICD-10-PCS | Mod: PBBFAC,,, | Performed by: OPHTHALMOLOGY

## 2021-03-24 PROCEDURE — 99999 PR PBB SHADOW E&M-EST. PATIENT-LVL II: CPT | Mod: PBBFAC,,, | Performed by: OPHTHALMOLOGY

## 2021-03-24 RX ORDER — NEOMYCIN SULFATE, POLYMYXIN B SULFATE AND DEXAMETHASONE 3.5; 10000; 1 MG/ML; [USP'U]/ML; MG/ML
1 SUSPENSION/ DROPS OPHTHALMIC 4 TIMES DAILY
Qty: 5 ML | Refills: 3 | Status: SHIPPED | OUTPATIENT
Start: 2021-03-24 | End: 2021-04-23

## 2021-03-26 ENCOUNTER — PATIENT MESSAGE (OUTPATIENT)
Dept: RESEARCH | Facility: HOSPITAL | Age: 51
End: 2021-03-26

## 2021-04-21 LAB — FUNGUS SPEC CULT: NORMAL

## 2021-04-22 ENCOUNTER — PATIENT MESSAGE (OUTPATIENT)
Dept: UROLOGY | Facility: CLINIC | Age: 51
End: 2021-04-22

## 2021-09-10 ENCOUNTER — TELEPHONE (OUTPATIENT)
Dept: OPTOMETRY | Facility: CLINIC | Age: 51
End: 2021-09-10

## 2021-09-14 ENCOUNTER — PATIENT MESSAGE (OUTPATIENT)
Dept: OPTOMETRY | Facility: CLINIC | Age: 51
End: 2021-09-14

## 2021-09-22 ENCOUNTER — OFFICE VISIT (OUTPATIENT)
Dept: INTERNAL MEDICINE | Facility: CLINIC | Age: 51
End: 2021-09-22
Payer: COMMERCIAL

## 2021-09-22 VITALS
HEART RATE: 62 BPM | SYSTOLIC BLOOD PRESSURE: 108 MMHG | BODY MASS INDEX: 19.21 KG/M2 | OXYGEN SATURATION: 100 % | WEIGHT: 108.44 LBS | HEIGHT: 63 IN | DIASTOLIC BLOOD PRESSURE: 72 MMHG

## 2021-09-22 DIAGNOSIS — Z12.31 SCREENING MAMMOGRAM, ENCOUNTER FOR: ICD-10-CM

## 2021-09-22 DIAGNOSIS — Z20.822 EXPOSURE TO COVID-19 VIRUS: ICD-10-CM

## 2021-09-22 DIAGNOSIS — Z11.59 NEED FOR HEPATITIS C SCREENING TEST: ICD-10-CM

## 2021-09-22 DIAGNOSIS — Z12.11 ENCOUNTER FOR COLORECTAL CANCER SCREENING: ICD-10-CM

## 2021-09-22 DIAGNOSIS — Z00.00 ANNUAL PHYSICAL EXAM: Primary | ICD-10-CM

## 2021-09-22 DIAGNOSIS — Z11.4 ENCOUNTER FOR SCREENING FOR HIV: ICD-10-CM

## 2021-09-22 DIAGNOSIS — Z12.12 ENCOUNTER FOR COLORECTAL CANCER SCREENING: ICD-10-CM

## 2021-09-22 PROCEDURE — 99396 PREV VISIT EST AGE 40-64: CPT | Mod: S$GLB,,, | Performed by: NURSE PRACTITIONER

## 2021-09-22 PROCEDURE — 3078F DIAST BP <80 MM HG: CPT | Mod: CPTII,S$GLB,, | Performed by: NURSE PRACTITIONER

## 2021-09-22 PROCEDURE — 3008F PR BODY MASS INDEX (BMI) DOCUMENTED: ICD-10-PCS | Mod: CPTII,S$GLB,, | Performed by: NURSE PRACTITIONER

## 2021-09-22 PROCEDURE — 1160F RVW MEDS BY RX/DR IN RCRD: CPT | Mod: CPTII,S$GLB,, | Performed by: NURSE PRACTITIONER

## 2021-09-22 PROCEDURE — 1160F PR REVIEW ALL MEDS BY PRESCRIBER/CLIN PHARMACIST DOCUMENTED: ICD-10-PCS | Mod: CPTII,S$GLB,, | Performed by: NURSE PRACTITIONER

## 2021-09-22 PROCEDURE — 99999 PR PBB SHADOW E&M-EST. PATIENT-LVL III: CPT | Mod: PBBFAC,,, | Performed by: NURSE PRACTITIONER

## 2021-09-22 PROCEDURE — 99999 PR PBB SHADOW E&M-EST. PATIENT-LVL III: ICD-10-PCS | Mod: PBBFAC,,, | Performed by: NURSE PRACTITIONER

## 2021-09-22 PROCEDURE — 3078F PR MOST RECENT DIASTOLIC BLOOD PRESSURE < 80 MM HG: ICD-10-PCS | Mod: CPTII,S$GLB,, | Performed by: NURSE PRACTITIONER

## 2021-09-22 PROCEDURE — 3074F SYST BP LT 130 MM HG: CPT | Mod: CPTII,S$GLB,, | Performed by: NURSE PRACTITIONER

## 2021-09-22 PROCEDURE — 1159F PR MEDICATION LIST DOCUMENTED IN MEDICAL RECORD: ICD-10-PCS | Mod: CPTII,S$GLB,, | Performed by: NURSE PRACTITIONER

## 2021-09-22 PROCEDURE — 1159F MED LIST DOCD IN RCRD: CPT | Mod: CPTII,S$GLB,, | Performed by: NURSE PRACTITIONER

## 2021-09-22 PROCEDURE — 99396 PR PREVENTIVE VISIT,EST,40-64: ICD-10-PCS | Mod: S$GLB,,, | Performed by: NURSE PRACTITIONER

## 2021-09-22 PROCEDURE — 3008F BODY MASS INDEX DOCD: CPT | Mod: CPTII,S$GLB,, | Performed by: NURSE PRACTITIONER

## 2021-09-22 PROCEDURE — 3074F PR MOST RECENT SYSTOLIC BLOOD PRESSURE < 130 MM HG: ICD-10-PCS | Mod: CPTII,S$GLB,, | Performed by: NURSE PRACTITIONER

## 2021-09-23 ENCOUNTER — PATIENT MESSAGE (OUTPATIENT)
Dept: INTERNAL MEDICINE | Facility: CLINIC | Age: 51
End: 2021-09-23

## 2021-09-23 ENCOUNTER — RESEARCH ENCOUNTER (OUTPATIENT)
Dept: RESEARCH | Facility: HOSPITAL | Age: 51
End: 2021-09-23

## 2021-09-23 ENCOUNTER — LAB VISIT (OUTPATIENT)
Dept: LAB | Facility: HOSPITAL | Age: 51
End: 2021-09-23
Payer: COMMERCIAL

## 2021-09-23 DIAGNOSIS — Z00.00 ANNUAL PHYSICAL EXAM: ICD-10-CM

## 2021-09-23 DIAGNOSIS — Z20.822 EXPOSURE TO COVID-19 VIRUS: ICD-10-CM

## 2021-09-23 DIAGNOSIS — Z11.4 ENCOUNTER FOR SCREENING FOR HIV: ICD-10-CM

## 2021-09-23 DIAGNOSIS — Z11.59 NEED FOR HEPATITIS C SCREENING TEST: ICD-10-CM

## 2021-09-23 DIAGNOSIS — Z00.00 ROUTINE GENERAL MEDICAL EXAMINATION AT A HEALTH CARE FACILITY: ICD-10-CM

## 2021-09-23 DIAGNOSIS — Z00.6 EXAMINATION OF PARTICIPANT IN CLINICAL TRIAL: Primary | ICD-10-CM

## 2021-09-23 DIAGNOSIS — R74.8 LOW SERUM ALKALINE PHOSPHATASE: Primary | ICD-10-CM

## 2021-09-23 LAB
25(OH)D3+25(OH)D2 SERPL-MCNC: 47 NG/ML (ref 30–96)
ALBUMIN SERPL BCP-MCNC: 4.2 G/DL (ref 3.5–5.2)
ALP SERPL-CCNC: 39 U/L (ref 55–135)
ALT SERPL W/O P-5'-P-CCNC: 12 U/L (ref 10–44)
ANION GAP SERPL CALC-SCNC: 11 MMOL/L (ref 8–16)
AST SERPL-CCNC: 16 U/L (ref 10–40)
BASOPHILS # BLD AUTO: 0.04 K/UL (ref 0–0.2)
BASOPHILS NFR BLD: 1.2 % (ref 0–1.9)
BILIRUB SERPL-MCNC: 0.7 MG/DL (ref 0.1–1)
BUN SERPL-MCNC: 15 MG/DL (ref 6–20)
CALCIUM SERPL-MCNC: 9.7 MG/DL (ref 8.7–10.5)
CHLORIDE SERPL-SCNC: 106 MMOL/L (ref 95–110)
CHOLEST SERPL-MCNC: 188 MG/DL (ref 120–199)
CHOLEST/HDLC SERPL: 3.1 {RATIO} (ref 2–5)
CO2 SERPL-SCNC: 23 MMOL/L (ref 23–29)
CREAT SERPL-MCNC: 0.7 MG/DL (ref 0.5–1.4)
DIFFERENTIAL METHOD: ABNORMAL
EOSINOPHIL # BLD AUTO: 0.2 K/UL (ref 0–0.5)
EOSINOPHIL NFR BLD: 4.5 % (ref 0–8)
ERYTHROCYTE [DISTWIDTH] IN BLOOD BY AUTOMATED COUNT: 12.7 % (ref 11.5–14.5)
EST. GFR  (AFRICAN AMERICAN): >60 ML/MIN/1.73 M^2
EST. GFR  (NON AFRICAN AMERICAN): >60 ML/MIN/1.73 M^2
GLUCOSE SERPL-MCNC: 88 MG/DL (ref 70–110)
HCT VFR BLD AUTO: 39.2 % (ref 37–48.5)
HCV AB SERPL QL IA: NEGATIVE
HDLC SERPL-MCNC: 61 MG/DL (ref 40–75)
HDLC SERPL: 32.4 % (ref 20–50)
HGB BLD-MCNC: 12.8 G/DL (ref 12–16)
HIV 1+2 AB+HIV1 P24 AG SERPL QL IA: NEGATIVE
IMM GRANULOCYTES # BLD AUTO: 0.01 K/UL (ref 0–0.04)
IMM GRANULOCYTES NFR BLD AUTO: 0.3 % (ref 0–0.5)
LDLC SERPL CALC-MCNC: 118.4 MG/DL (ref 63–159)
LYMPHOCYTES # BLD AUTO: 1.1 K/UL (ref 1–4.8)
LYMPHOCYTES NFR BLD: 33.3 % (ref 18–48)
MCH RBC QN AUTO: 30.6 PG (ref 27–31)
MCHC RBC AUTO-ENTMCNC: 32.7 G/DL (ref 32–36)
MCV RBC AUTO: 94 FL (ref 82–98)
MONOCYTES # BLD AUTO: 0.3 K/UL (ref 0.3–1)
MONOCYTES NFR BLD: 9.3 % (ref 4–15)
NEUTROPHILS # BLD AUTO: 1.7 K/UL (ref 1.8–7.7)
NEUTROPHILS NFR BLD: 51.4 % (ref 38–73)
NONHDLC SERPL-MCNC: 127 MG/DL
NRBC BLD-RTO: 0 /100 WBC
PLATELET # BLD AUTO: 212 K/UL (ref 150–450)
PMV BLD AUTO: 10.9 FL (ref 9.2–12.9)
POTASSIUM SERPL-SCNC: 4 MMOL/L (ref 3.5–5.1)
PROT SERPL-MCNC: 7.4 G/DL (ref 6–8.4)
RBC # BLD AUTO: 4.18 M/UL (ref 4–5.4)
SARS-COV-2 IGG SERPL IA-ACNC: 740 AU/ML
SARS-COV-2 IGG SERPL QL IA: POSITIVE
SODIUM SERPL-SCNC: 140 MMOL/L (ref 136–145)
TRIGL SERPL-MCNC: 43 MG/DL (ref 30–150)
TSH SERPL DL<=0.005 MIU/L-ACNC: 2 UIU/ML (ref 0.4–4)
WBC # BLD AUTO: 3.33 K/UL (ref 3.9–12.7)

## 2021-09-23 PROCEDURE — 86769 SARS-COV-2 COVID-19 ANTIBODY: CPT | Performed by: NURSE PRACTITIONER

## 2021-09-23 PROCEDURE — 80053 COMPREHEN METABOLIC PANEL: CPT | Performed by: NURSE PRACTITIONER

## 2021-09-23 PROCEDURE — 80061 LIPID PANEL: CPT | Performed by: NURSE PRACTITIONER

## 2021-09-23 PROCEDURE — 84443 ASSAY THYROID STIM HORMONE: CPT | Performed by: NURSE PRACTITIONER

## 2021-09-23 PROCEDURE — 85025 COMPLETE CBC W/AUTO DIFF WBC: CPT | Performed by: NURSE PRACTITIONER

## 2021-09-23 PROCEDURE — 86803 HEPATITIS C AB TEST: CPT | Performed by: NURSE PRACTITIONER

## 2021-09-23 PROCEDURE — 87389 HIV-1 AG W/HIV-1&-2 AB AG IA: CPT | Performed by: NURSE PRACTITIONER

## 2021-09-23 PROCEDURE — 82306 VITAMIN D 25 HYDROXY: CPT | Performed by: NURSE PRACTITIONER

## 2021-09-24 ENCOUNTER — PATIENT MESSAGE (OUTPATIENT)
Dept: INTERNAL MEDICINE | Facility: CLINIC | Age: 51
End: 2021-09-24

## 2021-09-24 ENCOUNTER — LAB VISIT (OUTPATIENT)
Dept: LAB | Facility: HOSPITAL | Age: 51
End: 2021-09-24
Payer: COMMERCIAL

## 2021-09-24 DIAGNOSIS — R74.8 LOW SERUM ALKALINE PHOSPHATASE: ICD-10-CM

## 2021-09-24 LAB — PHOSPHATE SERPL-MCNC: 3.5 MG/DL (ref 2.7–4.5)

## 2021-09-24 PROCEDURE — 84630 ASSAY OF ZINC: CPT | Performed by: NURSE PRACTITIONER

## 2021-09-24 PROCEDURE — 36415 COLL VENOUS BLD VENIPUNCTURE: CPT | Performed by: NURSE PRACTITIONER

## 2021-09-24 PROCEDURE — 84100 ASSAY OF PHOSPHORUS: CPT | Performed by: NURSE PRACTITIONER

## 2021-09-29 ENCOUNTER — TELEPHONE (OUTPATIENT)
Dept: INTERNAL MEDICINE | Facility: CLINIC | Age: 51
End: 2021-09-29

## 2021-09-29 ENCOUNTER — LAB VISIT (OUTPATIENT)
Dept: LAB | Facility: HOSPITAL | Age: 51
End: 2021-09-29
Payer: COMMERCIAL

## 2021-09-29 DIAGNOSIS — R74.8 LOW SERUM ALKALINE PHOSPHATASE: ICD-10-CM

## 2021-09-29 DIAGNOSIS — R74.8 LOW SERUM ALKALINE PHOSPHATASE: Primary | ICD-10-CM

## 2021-09-29 PROCEDURE — 84630 ASSAY OF ZINC: CPT | Performed by: NURSE PRACTITIONER

## 2021-09-29 PROCEDURE — 36415 COLL VENOUS BLD VENIPUNCTURE: CPT | Performed by: NURSE PRACTITIONER

## 2021-10-04 ENCOUNTER — PATIENT MESSAGE (OUTPATIENT)
Dept: INTERNAL MEDICINE | Facility: CLINIC | Age: 51
End: 2021-10-04

## 2021-10-04 LAB — ZINC SERPL-MCNC: 75 UG/DL (ref 60–130)

## 2021-10-06 ENCOUNTER — PATIENT MESSAGE (OUTPATIENT)
Dept: INTERNAL MEDICINE | Facility: CLINIC | Age: 51
End: 2021-10-06

## 2021-10-21 LAB — NONINV COLON CA DNA+OCC BLD SCRN STL QL: NEGATIVE

## 2021-11-11 ENCOUNTER — OFFICE VISIT (OUTPATIENT)
Dept: OPTOMETRY | Facility: CLINIC | Age: 51
End: 2021-11-11
Payer: COMMERCIAL

## 2021-11-11 DIAGNOSIS — Z97.3 WEARS CONTACT LENSES: ICD-10-CM

## 2021-11-11 DIAGNOSIS — H52.4 HYPEROPIA WITH PRESBYOPIA OF BOTH EYES: Primary | ICD-10-CM

## 2021-11-11 DIAGNOSIS — H52.03 HYPEROPIA WITH PRESBYOPIA OF BOTH EYES: Primary | ICD-10-CM

## 2021-11-11 PROCEDURE — 92310 PR CONTACT LENS FITTING (NO CHANGE): ICD-10-PCS | Mod: S$GLB,,, | Performed by: OPTOMETRIST

## 2021-11-11 PROCEDURE — 99999 PR PBB SHADOW E&M-EST. PATIENT-LVL II: ICD-10-PCS | Mod: PBBFAC,,, | Performed by: OPTOMETRIST

## 2021-11-11 PROCEDURE — 92015 PR REFRACTION: ICD-10-PCS | Mod: S$GLB,,, | Performed by: OPTOMETRIST

## 2021-11-11 PROCEDURE — 92310 CONTACT LENS FITTING OU: CPT | Mod: S$GLB,,, | Performed by: OPTOMETRIST

## 2021-11-11 PROCEDURE — 99999 PR PBB SHADOW E&M-EST. PATIENT-LVL II: CPT | Mod: PBBFAC,,, | Performed by: OPTOMETRIST

## 2021-11-11 PROCEDURE — 92014 PR EYE EXAM, EST PATIENT,COMPREHESV: ICD-10-PCS | Mod: S$GLB,,, | Performed by: OPTOMETRIST

## 2021-11-11 PROCEDURE — 92014 COMPRE OPH EXAM EST PT 1/>: CPT | Mod: S$GLB,,, | Performed by: OPTOMETRIST

## 2021-11-11 PROCEDURE — 92015 DETERMINE REFRACTIVE STATE: CPT | Mod: S$GLB,,, | Performed by: OPTOMETRIST

## 2021-12-20 ENCOUNTER — HOSPITAL ENCOUNTER (OUTPATIENT)
Dept: RADIOLOGY | Facility: HOSPITAL | Age: 51
Discharge: HOME | End: 2021-12-20
Attending: NURSE PRACTITIONER
Payer: COMMERCIAL

## 2021-12-20 DIAGNOSIS — Z12.31 SCREENING MAMMOGRAM, ENCOUNTER FOR: ICD-10-CM

## 2021-12-20 PROCEDURE — 77067 SCR MAMMO BI INCL CAD: CPT | Mod: TC

## 2021-12-20 PROCEDURE — 77067 SCR MAMMO BI INCL CAD: CPT | Mod: 26,,, | Performed by: RADIOLOGY

## 2021-12-20 PROCEDURE — 77063 BREAST TOMOSYNTHESIS BI: CPT | Mod: 26,,, | Performed by: RADIOLOGY

## 2021-12-20 PROCEDURE — 77063 MAMMO DIGITAL SCREENING BILAT WITH TOMO: ICD-10-PCS | Mod: 26,,, | Performed by: RADIOLOGY

## 2021-12-20 PROCEDURE — 77067 MAMMO DIGITAL SCREENING BILAT WITH TOMO: ICD-10-PCS | Mod: 26,,, | Performed by: RADIOLOGY

## 2022-01-24 ENCOUNTER — OFFICE VISIT (OUTPATIENT)
Dept: PRIMARY CARE CLINIC | Facility: CLINIC | Age: 52
End: 2022-01-24
Payer: COMMERCIAL

## 2022-01-24 VITALS
WEIGHT: 112 LBS | OXYGEN SATURATION: 97 % | TEMPERATURE: 98 F | BODY MASS INDEX: 19.84 KG/M2 | HEIGHT: 63 IN | DIASTOLIC BLOOD PRESSURE: 70 MMHG | SYSTOLIC BLOOD PRESSURE: 107 MMHG | HEART RATE: 70 BPM

## 2022-01-24 DIAGNOSIS — B96.89 ACUTE BACTERIAL SINUSITIS: Primary | ICD-10-CM

## 2022-01-24 DIAGNOSIS — J01.90 ACUTE BACTERIAL SINUSITIS: Primary | ICD-10-CM

## 2022-01-24 PROCEDURE — 3074F SYST BP LT 130 MM HG: CPT | Mod: CPTII,95,, | Performed by: FAMILY MEDICINE

## 2022-01-24 PROCEDURE — 99213 OFFICE O/P EST LOW 20 MIN: CPT | Mod: 95,,, | Performed by: FAMILY MEDICINE

## 2022-01-24 PROCEDURE — 3078F DIAST BP <80 MM HG: CPT | Mod: CPTII,95,, | Performed by: FAMILY MEDICINE

## 2022-01-24 PROCEDURE — 1159F PR MEDICATION LIST DOCUMENTED IN MEDICAL RECORD: ICD-10-PCS | Mod: CPTII,95,, | Performed by: FAMILY MEDICINE

## 2022-01-24 PROCEDURE — 3008F PR BODY MASS INDEX (BMI) DOCUMENTED: ICD-10-PCS | Mod: CPTII,95,, | Performed by: FAMILY MEDICINE

## 2022-01-24 PROCEDURE — 1159F MED LIST DOCD IN RCRD: CPT | Mod: CPTII,95,, | Performed by: FAMILY MEDICINE

## 2022-01-24 PROCEDURE — 3078F PR MOST RECENT DIASTOLIC BLOOD PRESSURE < 80 MM HG: ICD-10-PCS | Mod: CPTII,95,, | Performed by: FAMILY MEDICINE

## 2022-01-24 PROCEDURE — 99213 PR OFFICE/OUTPT VISIT, EST, LEVL III, 20-29 MIN: ICD-10-PCS | Mod: 95,,, | Performed by: FAMILY MEDICINE

## 2022-01-24 PROCEDURE — 3074F PR MOST RECENT SYSTOLIC BLOOD PRESSURE < 130 MM HG: ICD-10-PCS | Mod: CPTII,95,, | Performed by: FAMILY MEDICINE

## 2022-01-24 PROCEDURE — 3008F BODY MASS INDEX DOCD: CPT | Mod: CPTII,95,, | Performed by: FAMILY MEDICINE

## 2022-01-24 RX ORDER — AMOXICILLIN AND CLAVULANATE POTASSIUM 875; 125 MG/1; MG/1
1 TABLET, FILM COATED ORAL EVERY 12 HOURS
Qty: 20 TABLET | Refills: 0 | Status: SHIPPED | OUTPATIENT
Start: 2022-01-24 | End: 2022-02-03

## 2022-01-24 NOTE — PROGRESS NOTES
"Subjective:       Patient ID: Dione Egan is a 51 y.o. female.    Chief Complaint: Sinus Problem and Nasal Congestion    HPI  52 y/o female here with sinus symptoms.    She started with sinus symptoms 5 weeks ago that has stayed the same, she had sore throat which has resolved, now with nasal congestion, runny nose, sinus pressure frontotemporal, ear pressure, cough productive yellow or clear sputum, she denies f/n/v/d/constipation/cp/sob/urinary sx. Appetite good. She is taking otc tussin, tylenol which helps some, sleeping well with benadryl.    covid swab 12/21 negative and again 1/3/22 negative      Review of Systems   Constitutional: Negative for chills and fever.   HENT: Positive for postnasal drip and rhinorrhea. Negative for ear pain and sore throat.    Respiratory: Positive for cough. Negative for shortness of breath and wheezing.    Cardiovascular: Negative for chest pain.   Musculoskeletal: Negative for myalgias.   Skin: Negative for rash.   Allergic/Immunologic: Negative for environmental allergies.   Neurological: Positive for headaches.       Objective:      /70 (BP Location: Left arm, Patient Position: Sitting, BP Method: Medium (Automatic))   Pulse 70   Temp 97.7 °F (36.5 °C) (Oral)   Ht 5' 3" (1.6 m)   Wt 50.8 kg (112 lb)   LMP 01/20/2022 (Approximate)   SpO2 97%   BMI 19.84 kg/m²   Physical Exam  Constitutional:       General: She is not in acute distress.     Appearance: She is well-developed and well-nourished. She is not diaphoretic.   HENT:      Head: Normocephalic and atraumatic.   Pulmonary:      Effort: No respiratory distress.   Neurological:      Mental Status: She is alert.   Psychiatric:         Mood and Affect: Mood and affect normal.         Assessment:       1. Acute bacterial sinusitis        Plan:   Dione was seen today for sinus problem and nasal congestion.    Diagnoses and all orders for this visit:    Acute bacterial sinusitis  -     amoxicillin-clavulanate " 875-125mg (AUGMENTIN) 875-125 mg per tablet; Take 1 tablet by mouth every 12 (twelve) hours. for 10 days    The patient location is: [place]  The chief complaint leading to consultation is: sinus sx and cough    Visit type: audiovisual    Face to Face time with patient: 20 minutes of total time spent on the encounter, which includes face to face time and non-face to face time preparing to see the patient (eg, review of tests), Obtaining and/or reviewing separately obtained history, Documenting clinical information in the electronic or other health record, Independently interpreting results (not separately reported) and communicating results to the patient/family/caregiver, or Care coordination (not separately reported).         Each patient to whom he or she provides medical services by telemedicine is:  (1) informed of the relationship between the physician and patient and the respective role of any other health care provider with respect to management of the patient; and (2) notified that he or she may decline to receive medical services by telemedicine and may withdraw from such care at any time.    Notes:

## 2022-01-27 ENCOUNTER — LAB VISIT (OUTPATIENT)
Dept: PRIMARY CARE CLINIC | Facility: CLINIC | Age: 52
End: 2022-01-27
Payer: COMMERCIAL

## 2022-01-27 DIAGNOSIS — Z20.822 CONTACT WITH AND (SUSPECTED) EXPOSURE TO COVID-19: ICD-10-CM

## 2022-01-27 LAB
CTP QC/QA: YES
SARS-COV-2 AG RESP QL IA.RAPID: POSITIVE

## 2022-01-27 PROCEDURE — 87811 SARS-COV-2 COVID19 W/OPTIC: CPT

## 2022-03-03 DIAGNOSIS — H60.501 ACUTE OTITIS EXTERNA OF RIGHT EAR, UNSPECIFIED TYPE: Primary | ICD-10-CM

## 2022-03-03 RX ORDER — CIPROFLOXACIN AND DEXAMETHASONE 3; 1 MG/ML; MG/ML
4 SUSPENSION/ DROPS AURICULAR (OTIC) 2 TIMES DAILY
Qty: 7.5 ML | Refills: 0 | Status: SHIPPED | OUTPATIENT
Start: 2022-03-03 | End: 2022-03-10

## 2022-03-29 ENCOUNTER — PATIENT MESSAGE (OUTPATIENT)
Dept: RESEARCH | Facility: HOSPITAL | Age: 52
End: 2022-03-29
Payer: COMMERCIAL

## 2022-06-27 ENCOUNTER — OFFICE VISIT (OUTPATIENT)
Dept: INTERNAL MEDICINE | Facility: CLINIC | Age: 52
End: 2022-06-27
Payer: COMMERCIAL

## 2022-06-27 VITALS
WEIGHT: 111.75 LBS | HEIGHT: 64 IN | RESPIRATION RATE: 18 BRPM | OXYGEN SATURATION: 98 % | HEART RATE: 62 BPM | BODY MASS INDEX: 19.08 KG/M2 | DIASTOLIC BLOOD PRESSURE: 72 MMHG | SYSTOLIC BLOOD PRESSURE: 102 MMHG

## 2022-06-27 DIAGNOSIS — H66.42 SUPPURATIVE OTITIS MEDIA OF LEFT EAR, UNSPECIFIED CHRONICITY: Primary | ICD-10-CM

## 2022-06-27 DIAGNOSIS — M25.531 ACUTE PAIN OF RIGHT WRIST: ICD-10-CM

## 2022-06-27 DIAGNOSIS — Z00.00 LABORATORY EXAMINATION ORDERED AS PART OF A ROUTINE GENERAL MEDICAL EXAMINATION: ICD-10-CM

## 2022-06-27 PROCEDURE — 99999 PR PBB SHADOW E&M-EST. PATIENT-LVL III: CPT | Mod: PBBFAC,,, | Performed by: INTERNAL MEDICINE

## 2022-06-27 PROCEDURE — 99214 OFFICE O/P EST MOD 30 MIN: CPT | Mod: S$GLB,,, | Performed by: INTERNAL MEDICINE

## 2022-06-27 PROCEDURE — 3008F BODY MASS INDEX DOCD: CPT | Mod: CPTII,S$GLB,, | Performed by: INTERNAL MEDICINE

## 2022-06-27 PROCEDURE — 3074F PR MOST RECENT SYSTOLIC BLOOD PRESSURE < 130 MM HG: ICD-10-PCS | Mod: CPTII,S$GLB,, | Performed by: INTERNAL MEDICINE

## 2022-06-27 PROCEDURE — 3078F PR MOST RECENT DIASTOLIC BLOOD PRESSURE < 80 MM HG: ICD-10-PCS | Mod: CPTII,S$GLB,, | Performed by: INTERNAL MEDICINE

## 2022-06-27 PROCEDURE — 3074F SYST BP LT 130 MM HG: CPT | Mod: CPTII,S$GLB,, | Performed by: INTERNAL MEDICINE

## 2022-06-27 PROCEDURE — 3078F DIAST BP <80 MM HG: CPT | Mod: CPTII,S$GLB,, | Performed by: INTERNAL MEDICINE

## 2022-06-27 PROCEDURE — 1159F MED LIST DOCD IN RCRD: CPT | Mod: CPTII,S$GLB,, | Performed by: INTERNAL MEDICINE

## 2022-06-27 PROCEDURE — 99999 PR PBB SHADOW E&M-EST. PATIENT-LVL III: ICD-10-PCS | Mod: PBBFAC,,, | Performed by: INTERNAL MEDICINE

## 2022-06-27 PROCEDURE — 3008F PR BODY MASS INDEX (BMI) DOCUMENTED: ICD-10-PCS | Mod: CPTII,S$GLB,, | Performed by: INTERNAL MEDICINE

## 2022-06-27 PROCEDURE — 1159F PR MEDICATION LIST DOCUMENTED IN MEDICAL RECORD: ICD-10-PCS | Mod: CPTII,S$GLB,, | Performed by: INTERNAL MEDICINE

## 2022-06-27 PROCEDURE — 99214 PR OFFICE/OUTPT VISIT, EST, LEVL IV, 30-39 MIN: ICD-10-PCS | Mod: S$GLB,,, | Performed by: INTERNAL MEDICINE

## 2022-06-27 RX ORDER — AMOXICILLIN AND CLAVULANATE POTASSIUM 875; 125 MG/1; MG/1
1 TABLET, FILM COATED ORAL EVERY 12 HOURS
Qty: 14 TABLET | Refills: 0 | Status: SHIPPED | OUTPATIENT
Start: 2022-06-27 | End: 2022-06-27

## 2022-06-27 RX ORDER — AMOXICILLIN AND CLAVULANATE POTASSIUM 875; 125 MG/1; MG/1
1 TABLET, FILM COATED ORAL EVERY 12 HOURS
Qty: 14 TABLET | Refills: 0 | Status: SHIPPED | OUTPATIENT
Start: 2022-06-27 | End: 2023-08-16 | Stop reason: ALTCHOICE

## 2022-06-27 NOTE — PATIENT INSTRUCTIONS
Wrist brace at night time   Augmentin 875-125 mg 1 tablet twice a day for 7 days  Recommend starting an antihistamine nightly trial for 30 days.     Schedule fasting labwork in 3 months  Return to clinic in 3 months for annual physical exam    Immunizations due  Tetanus shot  Covid-19 booster  Shingles vaccination

## 2022-06-27 NOTE — PROGRESS NOTES
Subjective:       Patient ID: Dione Egan is a 51 y.o. female.    Chief Complaint: Numbness      HPI  Dione Egan is a 51 y.o. year old female presents for evaluation of pain / tightness / numbness to R arm x 3 weeks. Pain is described as sore, swollen, throbbing, worse with movement / activity, does keep her awake at night. Will wake up sometimes with throbbing pain / numbness. 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 Reports she has had a history of previous broken wrist which when she was younger would also inflame with activity. Denies any loss of strength, no dropped objects.     Also reports recently she has been experiencing pulsatile tinnitus / full ness to her L ear. States that loud noises caused her some pain to her left ear. Denies any discharge. Denies any loss of hearing. Notes that she can hear her heart beat in her ear when it is very quiet at night.     Went fishing this weekend, did not get any water in ear. May have aggravated her wrist more than previous.     Review of Systems   Constitutional: Negative for activity change, appetite change, fatigue, fever and unexpected weight change.   HENT: Positive for congestion (L ear). Negative for hearing loss, postnasal drip, sneezing, sore throat, trouble swallowing and voice change.    Eyes: Negative for pain and discharge.   Respiratory: Negative for cough, choking, chest tightness, shortness of breath and wheezing.    Cardiovascular: Negative for chest pain, palpitations and leg swelling.   Gastrointestinal: Negative for abdominal distention, abdominal pain, blood in stool, constipation, diarrhea, nausea and vomiting.   Endocrine: Negative for polydipsia and polyuria.   Genitourinary: Negative for difficulty urinating and flank pain.   Musculoskeletal: Positive for arthralgias and myalgias. Negative for back pain, joint swelling and neck pain.   Skin: Negative for rash.   Neurological: Negative for  "dizziness, tremors, seizures, weakness, numbness and headaches.   Psychiatric/Behavioral: Negative for agitation. The patient is not nervous/anxious.          Past Medical History:   Diagnosis Date    COURTNEY II (cervical intraepithelial neoplasia II) 2016    KJB---LEEP    CIS (carcinoma in situ of cervix) 2004    Dermoid cyst of ovary 2020    LEFT <1 CM        Prior to Admission medications    Medication Sig Start Date End Date Taking? Authorizing Provider   ergocalciferol (DRISDOL) 8,000 unit/mL Drop Take 2,000 Units by mouth once daily.    Historical Provider        Past medical history, surgical history, and family medical history reviewed and updated as appropriate.    Medications and allergies reviewed.     Objective:          Vitals:    06/27/22 1621   BP: 102/72   BP Location: Left arm   Patient Position: Sitting   BP Method: Medium (Manual)   Pulse: 62   Resp: 18   SpO2: 98%   Weight: 50.7 kg (111 lb 12.4 oz)   Height: 5' 4" (1.626 m)     Body mass index is 19.19 kg/m².  Physical Exam  Constitutional:       Appearance: She is well-developed.   HENT:      Head: Normocephalic and atraumatic.      Right Ear: A middle ear effusion (minimal) is present. Tympanic membrane is not bulging.      Left Ear: A middle ear effusion is present. Tympanic membrane is injected, erythematous and bulging. Tympanic membrane is not perforated.   Eyes:      Pupils: Pupils are equal, round, and reactive to light.   Cardiovascular:      Rate and Rhythm: Normal rate and regular rhythm.      Heart sounds: Normal heart sounds.   Pulmonary:      Effort: Pulmonary effort is normal. No respiratory distress.      Breath sounds: Normal breath sounds. No wheezing.   Abdominal:      General: Bowel sounds are normal. There is no distension.      Palpations: Abdomen is soft.      Tenderness: There is no abdominal tenderness.   Musculoskeletal:         General: No tenderness. Normal range of motion.      Cervical back: Normal range of motion. "   Skin:     General: Skin is warm and dry.   Neurological:      Mental Status: She is alert and oriented to person, place, and time.      Cranial Nerves: No cranial nerve deficit.      Deep Tendon Reflexes: Reflexes are normal and symmetric.         Lab Results   Component Value Date    WBC 3.33 (L) 09/23/2021    HGB 12.8 09/23/2021    HCT 39.2 09/23/2021     09/23/2021    CHOL 188 09/23/2021    TRIG 43 09/23/2021    HDL 61 09/23/2021    ALT 12 09/23/2021    AST 16 09/23/2021     09/23/2021    K 4.0 09/23/2021     09/23/2021    CREATININE 0.7 09/23/2021    BUN 15 09/23/2021    CO2 23 09/23/2021    TSH 2.000 09/23/2021       Assessment:       1. Suppurative otitis media of left ear, unspecified chronicity    2. Laboratory examination ordered as part of a routine general medical examination    3. Acute pain of right wrist          Plan:     Dione was seen today for numbness.    Diagnoses and all orders for this visit:    Suppurative otitis media of left ear, unspecified chronicity  -     -     amoxicillin-clavulanate 875-125mg (AUGMENTIN) 875-125 mg per tablet; Take 1 tablet by mouth every 12 (twelve) hours.    Laboratory examination ordered as part of a routine general medical examination  -     CBC Auto Differential; Future  -     Comprehensive Metabolic Panel; Future  -     TSH; Future  -     Hemoglobin A1C; Future  -     Lipid Panel; Future    Acute pain of right wrist  Comments:  history of previous trauma, inflammed more with activity, improves with ibuprofen; conservative management, wrist brace nightly and as needed.    Otitis media with effusion behind TM, may be chronic in nature but pt with symptoms today. Will treat as if AOM; instructed to start nightly antihistamine (zyrtec).  No pre or post auricular LAD, no cervical LAD  Otherwise benign examination, pt will return in 3 months with labs for annual exam.         Follow up in about 3 months (around 9/27/2022), or if symptoms worsen or  fail to improve.    Shaka Robledo MD  Internal Medicine / Primary Care  [facility]  6/27/2022

## 2022-08-18 ENCOUNTER — LAB VISIT (OUTPATIENT)
Dept: LAB | Facility: HOSPITAL | Age: 52
End: 2022-08-18
Attending: INTERNAL MEDICINE
Payer: COMMERCIAL

## 2022-08-18 DIAGNOSIS — Z00.00 LABORATORY EXAMINATION ORDERED AS PART OF A ROUTINE GENERAL MEDICAL EXAMINATION: ICD-10-CM

## 2022-08-18 LAB
ALBUMIN SERPL BCP-MCNC: 4 G/DL (ref 3.5–5.2)
ALP SERPL-CCNC: 42 U/L (ref 55–135)
ALT SERPL W/O P-5'-P-CCNC: 13 U/L (ref 10–44)
ANION GAP SERPL CALC-SCNC: 8 MMOL/L (ref 8–16)
AST SERPL-CCNC: 20 U/L (ref 10–40)
BASOPHILS # BLD AUTO: 0.06 K/UL (ref 0–0.2)
BASOPHILS NFR BLD: 1.7 % (ref 0–1.9)
BILIRUB SERPL-MCNC: 0.5 MG/DL (ref 0.1–1)
BUN SERPL-MCNC: 11 MG/DL (ref 6–20)
CALCIUM SERPL-MCNC: 9.4 MG/DL (ref 8.7–10.5)
CHLORIDE SERPL-SCNC: 108 MMOL/L (ref 95–110)
CHOLEST SERPL-MCNC: 198 MG/DL (ref 120–199)
CHOLEST/HDLC SERPL: 3.4 {RATIO} (ref 2–5)
CO2 SERPL-SCNC: 24 MMOL/L (ref 23–29)
CREAT SERPL-MCNC: 0.7 MG/DL (ref 0.5–1.4)
DIFFERENTIAL METHOD: ABNORMAL
EOSINOPHIL # BLD AUTO: 0.1 K/UL (ref 0–0.5)
EOSINOPHIL NFR BLD: 3.7 % (ref 0–8)
ERYTHROCYTE [DISTWIDTH] IN BLOOD BY AUTOMATED COUNT: 12.2 % (ref 11.5–14.5)
EST. GFR  (NO RACE VARIABLE): >60 ML/MIN/1.73 M^2
ESTIMATED AVG GLUCOSE: 100 MG/DL (ref 68–131)
GLUCOSE SERPL-MCNC: 90 MG/DL (ref 70–110)
HBA1C MFR BLD: 5.1 % (ref 4–5.6)
HCT VFR BLD AUTO: 37.4 % (ref 37–48.5)
HDLC SERPL-MCNC: 59 MG/DL (ref 40–75)
HDLC SERPL: 29.8 % (ref 20–50)
HGB BLD-MCNC: 12.7 G/DL (ref 12–16)
IMM GRANULOCYTES # BLD AUTO: 0.01 K/UL (ref 0–0.04)
IMM GRANULOCYTES NFR BLD AUTO: 0.3 % (ref 0–0.5)
LDLC SERPL CALC-MCNC: 128.8 MG/DL (ref 63–159)
LYMPHOCYTES # BLD AUTO: 1.3 K/UL (ref 1–4.8)
LYMPHOCYTES NFR BLD: 36.1 % (ref 18–48)
MCH RBC QN AUTO: 31.1 PG (ref 27–31)
MCHC RBC AUTO-ENTMCNC: 34 G/DL (ref 32–36)
MCV RBC AUTO: 91 FL (ref 82–98)
MONOCYTES # BLD AUTO: 0.4 K/UL (ref 0.3–1)
MONOCYTES NFR BLD: 10.5 % (ref 4–15)
NEUTROPHILS # BLD AUTO: 1.7 K/UL (ref 1.8–7.7)
NEUTROPHILS NFR BLD: 47.7 % (ref 38–73)
NONHDLC SERPL-MCNC: 139 MG/DL
NRBC BLD-RTO: 0 /100 WBC
PLATELET # BLD AUTO: 206 K/UL (ref 150–450)
PMV BLD AUTO: 10.7 FL (ref 9.2–12.9)
POTASSIUM SERPL-SCNC: 4.2 MMOL/L (ref 3.5–5.1)
PROT SERPL-MCNC: 7.1 G/DL (ref 6–8.4)
RBC # BLD AUTO: 4.09 M/UL (ref 4–5.4)
SODIUM SERPL-SCNC: 140 MMOL/L (ref 136–145)
TRIGL SERPL-MCNC: 51 MG/DL (ref 30–150)
TSH SERPL DL<=0.005 MIU/L-ACNC: 1.58 UIU/ML (ref 0.4–4)
WBC # BLD AUTO: 3.52 K/UL (ref 3.9–12.7)

## 2022-08-18 PROCEDURE — 80061 LIPID PANEL: CPT | Performed by: INTERNAL MEDICINE

## 2022-08-18 PROCEDURE — 36415 COLL VENOUS BLD VENIPUNCTURE: CPT | Performed by: INTERNAL MEDICINE

## 2022-08-18 PROCEDURE — 83036 HEMOGLOBIN GLYCOSYLATED A1C: CPT | Performed by: INTERNAL MEDICINE

## 2022-08-18 PROCEDURE — 80053 COMPREHEN METABOLIC PANEL: CPT | Performed by: INTERNAL MEDICINE

## 2022-08-18 PROCEDURE — 85025 COMPLETE CBC W/AUTO DIFF WBC: CPT | Performed by: INTERNAL MEDICINE

## 2022-08-18 PROCEDURE — 84443 ASSAY THYROID STIM HORMONE: CPT | Performed by: INTERNAL MEDICINE

## 2022-10-17 ENCOUNTER — TELEPHONE (OUTPATIENT)
Dept: OPHTHALMOLOGY | Facility: CLINIC | Age: 52
End: 2022-10-17
Payer: COMMERCIAL

## 2022-10-17 NOTE — TELEPHONE ENCOUNTER
----- Message from Dione Harpal sent at 10/17/2022 11:07 AM CDT -----  Regarding: Schedule  Pt called about her eyes burning and itchy and swollen. Pt feels a sharp pain.     Pts call back: [phone]

## 2022-10-20 ENCOUNTER — OFFICE VISIT (OUTPATIENT)
Dept: OPTOMETRY | Facility: CLINIC | Age: 52
End: 2022-10-20
Payer: COMMERCIAL

## 2022-10-20 DIAGNOSIS — H10.13 ALLERGIC CONJUNCTIVITIS OF BOTH EYES: Primary | ICD-10-CM

## 2022-10-20 PROCEDURE — 99999 PR PBB SHADOW E&M-EST. PATIENT-LVL II: ICD-10-PCS | Mod: PBBFAC,,, | Performed by: OPTOMETRIST

## 2022-10-20 PROCEDURE — 92012 INTRM OPH EXAM EST PATIENT: CPT | Mod: S$GLB,,, | Performed by: OPTOMETRIST

## 2022-10-20 PROCEDURE — 3044F PR MOST RECENT HEMOGLOBIN A1C LEVEL <7.0%: ICD-10-PCS | Mod: CPTII,S$GLB,, | Performed by: OPTOMETRIST

## 2022-10-20 PROCEDURE — 92012 PR EYE EXAM, EST PATIENT,INTERMED: ICD-10-PCS | Mod: S$GLB,,, | Performed by: OPTOMETRIST

## 2022-10-20 PROCEDURE — 99999 PR PBB SHADOW E&M-EST. PATIENT-LVL II: CPT | Mod: PBBFAC,,, | Performed by: OPTOMETRIST

## 2022-10-20 PROCEDURE — 3044F HG A1C LEVEL LT 7.0%: CPT | Mod: CPTII,S$GLB,, | Performed by: OPTOMETRIST

## 2022-10-20 PROCEDURE — 1159F PR MEDICATION LIST DOCUMENTED IN MEDICAL RECORD: ICD-10-PCS | Mod: CPTII,S$GLB,, | Performed by: OPTOMETRIST

## 2022-10-20 PROCEDURE — 1159F MED LIST DOCD IN RCRD: CPT | Mod: CPTII,S$GLB,, | Performed by: OPTOMETRIST

## 2022-10-20 RX ORDER — PREDNISOLONE ACETATE 10 MG/ML
1 SUSPENSION/ DROPS OPHTHALMIC 3 TIMES DAILY
Qty: 5 ML | Refills: 0 | Status: SHIPPED | OUTPATIENT
Start: 2022-10-20 | End: 2023-08-16 | Stop reason: ALTCHOICE

## 2022-10-20 NOTE — PROGRESS NOTES
URI Parham is a 52 y.o female here today with complaints of redness, tearing,   itching, burning OS>OD since about a week. FBS. Used preservative free   allergy drops (Alaway) helped a little. Since then symptoms have improved.  Wears monthly replacement contacts doesn't sleep in them. Uses Ariana   cleaning solution and has not had an issue with it before.       Last edited by Yissel Davis, OD on 10/20/2022  9:06 AM.        ROS    Positive for: Eyes  Negative for: Constitutional, Gastrointestinal, Neurological, Skin,   Genitourinary, Musculoskeletal, HENT, Endocrine, Cardiovascular,   Respiratory, Psychiatric, Allergic/Imm, Heme/Lymph  Last edited by Yissel Davis, OD on 10/20/2022  9:06 AM.        Assessment /Plan     For exam results, see Encounter Report.    Allergic conjunctivitis of both eyes  -     prednisoLONE acetate (PRED FORTE) 1 % DrpS; Place 1 drop into both eyes 3 (three) times daily.  Dispense: 5 mL; Refill: 0    CONTINUE USING ALAWAY FOR ITCHY EYES (ONE DROP IN EACH EYE ONCE PER DAY).   Pred Forte 1% prescribed one drop in each eye TID x 7-10 days.   RTC IF SYMPTOMS WORSEN OR FAIL TO IMPROVE.   Discussed switching to a biweekly or daily replacement CL.

## 2022-12-22 ENCOUNTER — OFFICE VISIT (OUTPATIENT)
Dept: OPTOMETRY | Facility: CLINIC | Age: 52
End: 2022-12-22
Payer: COMMERCIAL

## 2022-12-22 DIAGNOSIS — H52.03 HYPEROPIA OF BOTH EYES WITH ASTIGMATISM AND PRESBYOPIA: Primary | ICD-10-CM

## 2022-12-22 DIAGNOSIS — H52.4 HYPEROPIA OF BOTH EYES WITH ASTIGMATISM AND PRESBYOPIA: Primary | ICD-10-CM

## 2022-12-22 DIAGNOSIS — H52.203 HYPEROPIA OF BOTH EYES WITH ASTIGMATISM AND PRESBYOPIA: Primary | ICD-10-CM

## 2022-12-22 PROCEDURE — 92310 CONTACT LENS FITTING OU: CPT | Mod: S$GLB,,, | Performed by: OPTOMETRIST

## 2022-12-22 PROCEDURE — 92014 PR EYE EXAM, EST PATIENT,COMPREHESV: ICD-10-PCS | Mod: S$GLB,,, | Performed by: OPTOMETRIST

## 2022-12-22 PROCEDURE — 99999 PR PBB SHADOW E&M-EST. PATIENT-LVL II: CPT | Mod: PBBFAC,,, | Performed by: OPTOMETRIST

## 2022-12-22 PROCEDURE — 92015 DETERMINE REFRACTIVE STATE: CPT | Mod: S$GLB,,, | Performed by: OPTOMETRIST

## 2022-12-22 PROCEDURE — 92014 COMPRE OPH EXAM EST PT 1/>: CPT | Mod: S$GLB,,, | Performed by: OPTOMETRIST

## 2022-12-22 PROCEDURE — 92015 PR REFRACTION: ICD-10-PCS | Mod: S$GLB,,, | Performed by: OPTOMETRIST

## 2022-12-22 PROCEDURE — 99999 PR PBB SHADOW E&M-EST. PATIENT-LVL II: ICD-10-PCS | Mod: PBBFAC,,, | Performed by: OPTOMETRIST

## 2022-12-22 PROCEDURE — 92310 PR CONTACT LENS FITTING (NO CHANGE): ICD-10-PCS | Mod: S$GLB,,, | Performed by: OPTOMETRIST

## 2022-12-22 NOTE — PROGRESS NOTES
HPI    Annual eye exam    DLS- 10/20/22    52 y.o. is here for annual eye exam  Pt states her vision has change slightly  Pt wear cl most of the time, Michelle solution, PF ATs   Pt wear gl at night  Pt want to get a new rx for gl and cl    (-)Flashes (+)Floaters last week  (-)Itch, (-)tear, (-)burn, (-)Dryness.  (-) OTC Drops  (-)Photophobia(+)Glare night time headlights  (-) Headaches (-) Eye Pain (-) Double vision    Eye Medications: None    No Past Ocular history     No Family Ocular history     Last edited by Osvaldo Burton, OD on 12/22/2022  3:36 PM.            Assessment /Plan     For exam results, see Encounter Report.    Hyperopia of both eyes with astigmatism and presbyopia  -  Eyeglass Final Rx       Eyeglass Final Rx         Sphere Cylinder Dist VA Add    Right +2.00 Sphere 20/20 +1.75    Left +2.00 Sphere 20/20 +1.75      Type: PAL    Expiration Date: 12/22/2023                  Trial #2 1 wk PHREV  -trial Biotrue over michelle      RTC 1 yr

## 2023-01-03 ENCOUNTER — PATIENT MESSAGE (OUTPATIENT)
Dept: RESEARCH | Facility: HOSPITAL | Age: 53
End: 2023-01-03
Payer: COMMERCIAL

## 2023-01-04 ENCOUNTER — PATIENT MESSAGE (OUTPATIENT)
Dept: OPTOMETRY | Facility: CLINIC | Age: 53
End: 2023-01-04
Payer: COMMERCIAL

## 2023-01-04 DIAGNOSIS — Z12.31 OTHER SCREENING MAMMOGRAM: ICD-10-CM

## 2023-01-09 ENCOUNTER — TELEPHONE (OUTPATIENT)
Dept: OPTOMETRY | Facility: CLINIC | Age: 53
End: 2023-01-09
Payer: COMMERCIAL

## 2023-01-09 NOTE — TELEPHONE ENCOUNTER
Final  Contact Lens Final Rx       Final Contact Lens Rx         Brand Sphere Cylinder Add    Right Bausch and Lomb Ultra for Presbyopia +2.00 Sphere High    Left Bausch and Lomb Ultra for Presbyopia +2.00 Sphere High      Expiration Date: 1/9/2024    Replacement: Monthly    Wearing Schedule: Daily Wear

## 2023-01-11 ENCOUNTER — PATIENT MESSAGE (OUTPATIENT)
Dept: OPTOMETRY | Facility: CLINIC | Age: 53
End: 2023-01-11
Payer: COMMERCIAL

## 2023-01-26 ENCOUNTER — PATIENT MESSAGE (OUTPATIENT)
Dept: OPTOMETRY | Facility: CLINIC | Age: 53
End: 2023-01-26
Payer: COMMERCIAL

## 2023-02-16 ENCOUNTER — OFFICE VISIT (OUTPATIENT)
Dept: OPTOMETRY | Facility: CLINIC | Age: 53
End: 2023-02-16
Payer: COMMERCIAL

## 2023-02-16 DIAGNOSIS — H52.4 HYPEROPIA OF BOTH EYES WITH ASTIGMATISM AND PRESBYOPIA: Primary | ICD-10-CM

## 2023-02-16 DIAGNOSIS — H52.03 HYPEROPIA OF BOTH EYES WITH ASTIGMATISM AND PRESBYOPIA: Primary | ICD-10-CM

## 2023-02-16 DIAGNOSIS — H52.203 HYPEROPIA OF BOTH EYES WITH ASTIGMATISM AND PRESBYOPIA: Primary | ICD-10-CM

## 2023-02-16 PROCEDURE — 1159F PR MEDICATION LIST DOCUMENTED IN MEDICAL RECORD: ICD-10-PCS | Mod: CPTII,S$GLB,, | Performed by: OPTOMETRIST

## 2023-02-16 PROCEDURE — 1159F MED LIST DOCD IN RCRD: CPT | Mod: CPTII,S$GLB,, | Performed by: OPTOMETRIST

## 2023-02-16 PROCEDURE — 99499 NO LOS: ICD-10-PCS | Mod: S$GLB,,, | Performed by: OPTOMETRIST

## 2023-02-16 PROCEDURE — 99499 UNLISTED E&M SERVICE: CPT | Mod: S$GLB,,, | Performed by: OPTOMETRIST

## 2023-02-16 NOTE — PROGRESS NOTES
HPI     CL Follow up   DLS-12/22/22    52 y.o. is here for clfu  Pt feels close up vision is blurry  Pt feels near vision cl is not comfortable  Pt want to get a cl for near vision cl       Last edited by Martin Sommers MA on 2/16/2023  8:38 AM.            Assessment /Plan     For exam results, see Encounter Report.    Hyperopia of both eyes with astigmatism and presbyopia  Dispensed +2.25 OU trial #2, 1 wk [facility]    RTC annual

## 2023-03-01 ENCOUNTER — PATIENT MESSAGE (OUTPATIENT)
Dept: OPTOMETRY | Facility: CLINIC | Age: 53
End: 2023-03-01
Payer: COMMERCIAL

## 2023-03-02 ENCOUNTER — TELEPHONE (OUTPATIENT)
Dept: OPHTHALMOLOGY | Facility: CLINIC | Age: 53
End: 2023-03-02
Payer: COMMERCIAL

## 2023-03-02 NOTE — TELEPHONE ENCOUNTER
Final  Contact Lens Final Rx       Final Contact Lens Rx         Brand Base Curve Diameter Sphere Cylinder Add    Right Bausch and Lomb Ultra for Presbyopia 8.50 14.2 +2.25 Sphere High    Left Bausch and Lomb Ultra for Presbyopia 8.50 14.2 +2.25 Sphere High      Expiration Date: 3/2/2024    Replacement: Monthly    Wearing Schedule: Daily Wear

## 2023-03-06 ENCOUNTER — PATIENT MESSAGE (OUTPATIENT)
Dept: OPTOMETRY | Facility: CLINIC | Age: 53
End: 2023-03-06
Payer: COMMERCIAL

## 2023-03-30 ENCOUNTER — CLINICAL SUPPORT (OUTPATIENT)
Dept: OTHER | Facility: CLINIC | Age: 53
End: 2023-03-30

## 2023-03-30 DIAGNOSIS — Z00.8 ENCOUNTER FOR OTHER GENERAL EXAMINATION: ICD-10-CM

## 2023-03-31 VITALS
HEIGHT: 64 IN | BODY MASS INDEX: 19.29 KG/M2 | WEIGHT: 113 LBS | DIASTOLIC BLOOD PRESSURE: 68 MMHG | SYSTOLIC BLOOD PRESSURE: 100 MMHG

## 2023-03-31 LAB
HDLC SERPL-MCNC: 51 MG/DL
POC CHOLESTEROL, TOTAL: 190 MG/DL
POC GLUCOSE, FASTING: 91 MG/DL (ref 60–110)
TRIGL SERPL-MCNC: 44 MG/DL

## 2023-08-16 ENCOUNTER — OFFICE VISIT (OUTPATIENT)
Dept: INTERNAL MEDICINE | Facility: CLINIC | Age: 53
End: 2023-08-16
Payer: COMMERCIAL

## 2023-08-16 VITALS
HEART RATE: 65 BPM | WEIGHT: 115.5 LBS | BODY MASS INDEX: 19.72 KG/M2 | HEIGHT: 64 IN | SYSTOLIC BLOOD PRESSURE: 110 MMHG | OXYGEN SATURATION: 100 % | DIASTOLIC BLOOD PRESSURE: 70 MMHG

## 2023-08-16 DIAGNOSIS — K59.00 CONSTIPATION, UNSPECIFIED CONSTIPATION TYPE: ICD-10-CM

## 2023-08-16 DIAGNOSIS — Z23 NEED FOR TDAP VACCINATION: ICD-10-CM

## 2023-08-16 DIAGNOSIS — R10.13 DYSPEPSIA: ICD-10-CM

## 2023-08-16 DIAGNOSIS — Z00.00 LABORATORY EXAMINATION ORDERED AS PART OF A ROUTINE GENERAL MEDICAL EXAMINATION: ICD-10-CM

## 2023-08-16 DIAGNOSIS — Z00.00 ANNUAL PHYSICAL EXAM: Primary | ICD-10-CM

## 2023-08-16 DIAGNOSIS — Z01.419 WELL WOMAN EXAM: ICD-10-CM

## 2023-08-16 PROCEDURE — 99396 PREV VISIT EST AGE 40-64: CPT | Mod: S$GLB,,, | Performed by: INTERNAL MEDICINE

## 2023-08-16 PROCEDURE — 3008F PR BODY MASS INDEX (BMI) DOCUMENTED: ICD-10-PCS | Mod: CPTII,S$GLB,, | Performed by: INTERNAL MEDICINE

## 2023-08-16 PROCEDURE — 99396 PR PREVENTIVE VISIT,EST,40-64: ICD-10-PCS | Mod: S$GLB,,, | Performed by: INTERNAL MEDICINE

## 2023-08-16 PROCEDURE — 3044F HG A1C LEVEL LT 7.0%: CPT | Mod: CPTII,S$GLB,, | Performed by: INTERNAL MEDICINE

## 2023-08-16 PROCEDURE — 3078F PR MOST RECENT DIASTOLIC BLOOD PRESSURE < 80 MM HG: ICD-10-PCS | Mod: CPTII,S$GLB,, | Performed by: INTERNAL MEDICINE

## 2023-08-16 PROCEDURE — 3044F PR MOST RECENT HEMOGLOBIN A1C LEVEL <7.0%: ICD-10-PCS | Mod: CPTII,S$GLB,, | Performed by: INTERNAL MEDICINE

## 2023-08-16 PROCEDURE — 3078F DIAST BP <80 MM HG: CPT | Mod: CPTII,S$GLB,, | Performed by: INTERNAL MEDICINE

## 2023-08-16 PROCEDURE — 3074F PR MOST RECENT SYSTOLIC BLOOD PRESSURE < 130 MM HG: ICD-10-PCS | Mod: CPTII,S$GLB,, | Performed by: INTERNAL MEDICINE

## 2023-08-16 PROCEDURE — 99999 PR PBB SHADOW E&M-EST. PATIENT-LVL IV: CPT | Mod: PBBFAC,,, | Performed by: INTERNAL MEDICINE

## 2023-08-16 PROCEDURE — 99999 PR PBB SHADOW E&M-EST. PATIENT-LVL IV: ICD-10-PCS | Mod: PBBFAC,,, | Performed by: INTERNAL MEDICINE

## 2023-08-16 PROCEDURE — 3074F SYST BP LT 130 MM HG: CPT | Mod: CPTII,S$GLB,, | Performed by: INTERNAL MEDICINE

## 2023-08-16 PROCEDURE — 3008F BODY MASS INDEX DOCD: CPT | Mod: CPTII,S$GLB,, | Performed by: INTERNAL MEDICINE

## 2023-08-16 NOTE — PROGRESS NOTES
Subjective:       Patient ID: Dione Egan is a 52 y.o. female.    Chief Complaint: Annual Exam, Constipation, and Hand Pain      HPI  Dione Egan is a 52 y.o. year old female established patient presents for annual exam.     Review of Systems   Constitutional:  Negative for activity change, appetite change, fatigue, fever and unexpected weight change.   HENT:  Negative for congestion, hearing loss, postnasal drip, rhinorrhea, sneezing, sore throat, trouble swallowing and voice change.    Eyes:  Negative for pain, discharge and visual disturbance.   Respiratory:  Negative for cough, choking, chest tightness, shortness of breath and wheezing.    Cardiovascular:  Negative for chest pain, palpitations and leg swelling.   Gastrointestinal:  Negative for abdominal distention, abdominal pain, blood in stool, constipation, diarrhea, nausea and vomiting.   Endocrine: Negative for polydipsia and polyuria.   Genitourinary:  Negative for difficulty urinating, dysuria, flank pain, hematuria and menstrual problem.   Musculoskeletal:  Negative for arthralgias, back pain, joint swelling, myalgias and neck pain.   Skin:  Negative for rash.   Neurological:  Negative for dizziness, tremors, seizures, weakness, numbness and headaches.   Psychiatric/Behavioral:  Negative for agitation, confusion and dysphoric mood. The patient is not nervous/anxious.          Past Medical History:   Diagnosis Date    COURTNEY II (cervical intraepithelial neoplasia II) 2016    KJB---LEEP    CIS (carcinoma in situ of cervix) 2004    Dermoid cyst of ovary 2020    LEFT <1 CM        Prior to Admission medications    Medication Sig Start Date End Date Taking? Authorizing Provider   DIPH,PERTUSS,ACEL,,TET VAC,PF, ADULT (ADACEL) 2 Lf-(2.5-5-3-5 mcg)-5Lf/0.5 mL Syrg Inject 0.5 mLs into the muscle once. for 1 dose 8/16/23 8/16/23  Shaka Robledo MD   amoxicillin-clavulanate 875-125mg (AUGMENTIN) 875-125 mg per tablet Take 1 tablet by mouth every 12 (twelve) hours.  "6/27/22 8/16/23  Shaka Robledo MD   prednisoLONE acetate (PRED FORTE) 1 % DrpS Place 1 drop into both eyes 3 (three) times daily. 10/20/22 8/16/23  Yissel Davis OD        Past medical history, surgical history, and family medical history reviewed and updated as appropriate.    Medications and allergies reviewed.     Objective:          Vitals:    08/16/23 0827   BP: 110/70   BP Location: Right arm   Patient Position: Sitting   Pulse: 65   SpO2: 100%   Weight: 52.4 kg (115 lb 8.3 oz)   Height: 5' 4" (1.626 m)     Body mass index is 19.83 kg/m².  Physical Exam  Constitutional:       Appearance: She is well-developed.   HENT:      Head: Normocephalic and atraumatic.   Eyes:      Extraocular Movements: Extraocular movements intact.   Cardiovascular:      Rate and Rhythm: Normal rate and regular rhythm.      Heart sounds: Normal heart sounds.   Pulmonary:      Effort: Pulmonary effort is normal. No respiratory distress.      Breath sounds: Normal breath sounds. No wheezing.   Abdominal:      General: Bowel sounds are normal. There is no distension.      Palpations: Abdomen is soft.      Tenderness: There is no abdominal tenderness.   Musculoskeletal:         General: No tenderness. Normal range of motion.      Cervical back: Normal range of motion.   Skin:     General: Skin is warm and dry.   Neurological:      Mental Status: She is alert and oriented to person, place, and time.      Cranial Nerves: No cranial nerve deficit.      Deep Tendon Reflexes: Reflexes are normal and symmetric.       Lab Results   Component Value Date    WBC 3.52 (L) 08/18/2022    HGB 12.7 08/18/2022    HCT 37.4 08/18/2022     08/18/2022    CHOL 198 08/18/2022    TRIG 51 08/18/2022    HDL 59 08/18/2022    ALT 13 08/18/2022    AST 20 08/18/2022     08/18/2022    K 4.2 08/18/2022     08/18/2022    CREATININE 0.7 08/18/2022    BUN 11 08/18/2022    CO2 24 08/18/2022    TSH 1.580 08/18/2022    HGBA1C 5.1 08/18/2022 "       Assessment:       1. Annual physical exam    2. Constipation, unspecified constipation type    3. Dyspepsia    4. Laboratory examination ordered as part of a routine general medical examination    5. Well woman exam    6. Need for Tdap vaccination          Plan:     Dione was seen today for annual exam, constipation and hand pain.    Diagnoses and all orders for this visit:    Annual physical exam    Constipation, unspecified constipation type  Comments:  bowel habitus have changed, worsening constipation    Dyspepsia    Laboratory examination ordered as part of a routine general medical examination  -     CBC Auto Differential; Future  -     Comprehensive Metabolic Panel; Future  -     TSH; Future  -     Hemoglobin A1C; Future  -     Lipid Panel; Future    Well woman exam  -     Ambulatory referral/consult to Obstetrics / Gynecology; Future    Need for Tdap vaccination  -     DIPH,PERTUSS,ACEL,,TET VAC,PF, ADULT (ADACEL) 2 Lf-(2.5-5-3-5 mcg)-5Lf/0.5 mL Syrg; Inject 0.5 mLs into the muscle once. for 1 dose        Health maintenance reviewed with patient.   Follow up in about 1 year (around 8/16/2024).    Shaka Robledo MD  Internal Medicine / Primary Care  [facility]  8/16/2023

## 2023-08-16 NOTE — PATIENT INSTRUCTIONS
Take tetanus shot prescription to immunization center.     Fasting labwork tomorrow.  Schedule follow up with ob/gyn for well woman exam (Dr. Kinney)  Schedule mammogram      Return to clinic in 1 year or sooner if needed.

## 2023-08-17 ENCOUNTER — LAB VISIT (OUTPATIENT)
Dept: LAB | Facility: HOSPITAL | Age: 53
End: 2023-08-17
Attending: INTERNAL MEDICINE
Payer: COMMERCIAL

## 2023-08-17 DIAGNOSIS — Z00.00 LABORATORY EXAMINATION ORDERED AS PART OF A ROUTINE GENERAL MEDICAL EXAMINATION: ICD-10-CM

## 2023-08-17 LAB
ALBUMIN SERPL BCP-MCNC: 4.2 G/DL (ref 3.5–5.2)
ALP SERPL-CCNC: 55 U/L (ref 55–135)
ALT SERPL W/O P-5'-P-CCNC: 12 U/L (ref 10–44)
ANION GAP SERPL CALC-SCNC: 8 MMOL/L (ref 8–16)
AST SERPL-CCNC: 18 U/L (ref 10–40)
BASOPHILS # BLD AUTO: 0.04 K/UL (ref 0–0.2)
BASOPHILS NFR BLD: 1.1 % (ref 0–1.9)
BILIRUB SERPL-MCNC: 0.5 MG/DL (ref 0.1–1)
BUN SERPL-MCNC: 20 MG/DL (ref 6–20)
CALCIUM SERPL-MCNC: 9.8 MG/DL (ref 8.7–10.5)
CHLORIDE SERPL-SCNC: 107 MMOL/L (ref 95–110)
CHOLEST SERPL-MCNC: 217 MG/DL (ref 120–199)
CHOLEST/HDLC SERPL: 4 {RATIO} (ref 2–5)
CO2 SERPL-SCNC: 26 MMOL/L (ref 23–29)
CREAT SERPL-MCNC: 0.8 MG/DL (ref 0.5–1.4)
DIFFERENTIAL METHOD: ABNORMAL
EOSINOPHIL # BLD AUTO: 0.2 K/UL (ref 0–0.5)
EOSINOPHIL NFR BLD: 3.9 % (ref 0–8)
ERYTHROCYTE [DISTWIDTH] IN BLOOD BY AUTOMATED COUNT: 11.5 % (ref 11.5–14.5)
EST. GFR  (NO RACE VARIABLE): >60 ML/MIN/1.73 M^2
ESTIMATED AVG GLUCOSE: 100 MG/DL (ref 68–131)
GLUCOSE SERPL-MCNC: 94 MG/DL (ref 70–110)
HBA1C MFR BLD: 5.1 % (ref 4–5.6)
HCT VFR BLD AUTO: 38.3 % (ref 37–48.5)
HDLC SERPL-MCNC: 54 MG/DL (ref 40–75)
HDLC SERPL: 24.9 % (ref 20–50)
HGB BLD-MCNC: 12.6 G/DL (ref 12–16)
IMM GRANULOCYTES # BLD AUTO: 0 K/UL (ref 0–0.04)
IMM GRANULOCYTES NFR BLD AUTO: 0 % (ref 0–0.5)
LDLC SERPL CALC-MCNC: 153.8 MG/DL (ref 63–159)
LYMPHOCYTES # BLD AUTO: 1.2 K/UL (ref 1–4.8)
LYMPHOCYTES NFR BLD: 31.8 % (ref 18–48)
MCH RBC QN AUTO: 30.4 PG (ref 27–31)
MCHC RBC AUTO-ENTMCNC: 32.9 G/DL (ref 32–36)
MCV RBC AUTO: 92 FL (ref 82–98)
MONOCYTES # BLD AUTO: 0.3 K/UL (ref 0.3–1)
MONOCYTES NFR BLD: 7.1 % (ref 4–15)
NEUTROPHILS # BLD AUTO: 2.1 K/UL (ref 1.8–7.7)
NEUTROPHILS NFR BLD: 56.1 % (ref 38–73)
NONHDLC SERPL-MCNC: 163 MG/DL
NRBC BLD-RTO: 0 /100 WBC
PLATELET # BLD AUTO: 202 K/UL (ref 150–450)
PMV BLD AUTO: 11.4 FL (ref 9.2–12.9)
POTASSIUM SERPL-SCNC: 4 MMOL/L (ref 3.5–5.1)
PROT SERPL-MCNC: 7.5 G/DL (ref 6–8.4)
RBC # BLD AUTO: 4.15 M/UL (ref 4–5.4)
SODIUM SERPL-SCNC: 141 MMOL/L (ref 136–145)
TRIGL SERPL-MCNC: 46 MG/DL (ref 30–150)
TSH SERPL DL<=0.005 MIU/L-ACNC: 2.29 UIU/ML (ref 0.4–4)
WBC # BLD AUTO: 3.8 K/UL (ref 3.9–12.7)

## 2023-08-17 PROCEDURE — 80053 COMPREHEN METABOLIC PANEL: CPT | Performed by: INTERNAL MEDICINE

## 2023-08-17 PROCEDURE — 36415 COLL VENOUS BLD VENIPUNCTURE: CPT | Performed by: INTERNAL MEDICINE

## 2023-08-17 PROCEDURE — 80061 LIPID PANEL: CPT | Performed by: INTERNAL MEDICINE

## 2023-08-17 PROCEDURE — 84443 ASSAY THYROID STIM HORMONE: CPT | Performed by: INTERNAL MEDICINE

## 2023-08-17 PROCEDURE — 83036 HEMOGLOBIN GLYCOSYLATED A1C: CPT | Performed by: INTERNAL MEDICINE

## 2023-08-17 PROCEDURE — 85025 COMPLETE CBC W/AUTO DIFF WBC: CPT | Performed by: INTERNAL MEDICINE

## 2023-09-05 ENCOUNTER — HOSPITAL ENCOUNTER (OUTPATIENT)
Dept: RADIOLOGY | Facility: HOSPITAL | Age: 53
Discharge: HOME | End: 2023-09-05
Attending: INTERNAL MEDICINE
Payer: COMMERCIAL

## 2023-09-05 DIAGNOSIS — Z12.31 OTHER SCREENING MAMMOGRAM: ICD-10-CM

## 2023-09-05 PROCEDURE — 77063 MAMMO DIGITAL SCREENING BILAT WITH TOMO: ICD-10-PCS | Mod: 26,,, | Performed by: RADIOLOGY

## 2023-09-05 PROCEDURE — 77067 MAMMO DIGITAL SCREENING BILAT WITH TOMO: ICD-10-PCS | Mod: 26,,, | Performed by: RADIOLOGY

## 2023-09-05 PROCEDURE — 77067 SCR MAMMO BI INCL CAD: CPT | Mod: 26,,, | Performed by: RADIOLOGY

## 2023-09-05 PROCEDURE — 77067 SCR MAMMO BI INCL CAD: CPT | Mod: TC

## 2023-09-05 PROCEDURE — 77063 BREAST TOMOSYNTHESIS BI: CPT | Mod: 26,,, | Performed by: RADIOLOGY

## 2023-10-26 DIAGNOSIS — B49 FUNGAL INFECTION: Primary | ICD-10-CM

## 2023-10-26 RX ORDER — KETOCONAZOLE 20 MG/G
CREAM TOPICAL DAILY
Qty: 15 G | Refills: 0 | Status: SHIPPED | OUTPATIENT
Start: 2023-10-26 | End: 2023-11-01

## 2023-11-01 ENCOUNTER — OFFICE VISIT (OUTPATIENT)
Dept: OBSTETRICS AND GYNECOLOGY | Facility: CLINIC | Age: 53
End: 2023-11-01
Payer: COMMERCIAL

## 2023-11-01 VITALS
WEIGHT: 115.94 LBS | BODY MASS INDEX: 19.79 KG/M2 | DIASTOLIC BLOOD PRESSURE: 75 MMHG | SYSTOLIC BLOOD PRESSURE: 110 MMHG | HEIGHT: 64 IN

## 2023-11-01 DIAGNOSIS — Z11.51 ENCOUNTER FOR SCREENING FOR HUMAN PAPILLOMAVIRUS (HPV): ICD-10-CM

## 2023-11-01 DIAGNOSIS — Z01.419 WELL WOMAN EXAM: Primary | ICD-10-CM

## 2023-11-01 DIAGNOSIS — Z12.4 ENCOUNTER FOR PAPANICOLAOU SMEAR FOR CERVICAL CANCER SCREENING: ICD-10-CM

## 2023-11-01 PROCEDURE — 99386 PR PREVENTIVE VISIT,NEW,40-64: ICD-10-PCS | Mod: S$GLB,,, | Performed by: STUDENT IN AN ORGANIZED HEALTH CARE EDUCATION/TRAINING PROGRAM

## 2023-11-01 PROCEDURE — 99999 PR PBB SHADOW E&M-EST. PATIENT-LVL III: CPT | Mod: PBBFAC,,, | Performed by: STUDENT IN AN ORGANIZED HEALTH CARE EDUCATION/TRAINING PROGRAM

## 2023-11-01 PROCEDURE — 1159F PR MEDICATION LIST DOCUMENTED IN MEDICAL RECORD: ICD-10-PCS | Mod: CPTII,S$GLB,, | Performed by: STUDENT IN AN ORGANIZED HEALTH CARE EDUCATION/TRAINING PROGRAM

## 2023-11-01 PROCEDURE — 1160F RVW MEDS BY RX/DR IN RCRD: CPT | Mod: CPTII,S$GLB,, | Performed by: STUDENT IN AN ORGANIZED HEALTH CARE EDUCATION/TRAINING PROGRAM

## 2023-11-01 PROCEDURE — 3044F PR MOST RECENT HEMOGLOBIN A1C LEVEL <7.0%: ICD-10-PCS | Mod: CPTII,S$GLB,, | Performed by: STUDENT IN AN ORGANIZED HEALTH CARE EDUCATION/TRAINING PROGRAM

## 2023-11-01 PROCEDURE — 99386 PREV VISIT NEW AGE 40-64: CPT | Mod: S$GLB,,, | Performed by: STUDENT IN AN ORGANIZED HEALTH CARE EDUCATION/TRAINING PROGRAM

## 2023-11-01 PROCEDURE — 3078F DIAST BP <80 MM HG: CPT | Mod: CPTII,S$GLB,, | Performed by: STUDENT IN AN ORGANIZED HEALTH CARE EDUCATION/TRAINING PROGRAM

## 2023-11-01 PROCEDURE — 3078F PR MOST RECENT DIASTOLIC BLOOD PRESSURE < 80 MM HG: ICD-10-PCS | Mod: CPTII,S$GLB,, | Performed by: STUDENT IN AN ORGANIZED HEALTH CARE EDUCATION/TRAINING PROGRAM

## 2023-11-01 PROCEDURE — 99999 PR PBB SHADOW E&M-EST. PATIENT-LVL III: ICD-10-PCS | Mod: PBBFAC,,, | Performed by: STUDENT IN AN ORGANIZED HEALTH CARE EDUCATION/TRAINING PROGRAM

## 2023-11-01 PROCEDURE — 1160F PR REVIEW ALL MEDS BY PRESCRIBER/CLIN PHARMACIST DOCUMENTED: ICD-10-PCS | Mod: CPTII,S$GLB,, | Performed by: STUDENT IN AN ORGANIZED HEALTH CARE EDUCATION/TRAINING PROGRAM

## 2023-11-01 PROCEDURE — 3008F BODY MASS INDEX DOCD: CPT | Mod: CPTII,S$GLB,, | Performed by: STUDENT IN AN ORGANIZED HEALTH CARE EDUCATION/TRAINING PROGRAM

## 2023-11-01 PROCEDURE — 3074F PR MOST RECENT SYSTOLIC BLOOD PRESSURE < 130 MM HG: ICD-10-PCS | Mod: CPTII,S$GLB,, | Performed by: STUDENT IN AN ORGANIZED HEALTH CARE EDUCATION/TRAINING PROGRAM

## 2023-11-01 PROCEDURE — 87624 HPV HI-RISK TYP POOLED RSLT: CPT | Performed by: STUDENT IN AN ORGANIZED HEALTH CARE EDUCATION/TRAINING PROGRAM

## 2023-11-01 PROCEDURE — 3074F SYST BP LT 130 MM HG: CPT | Mod: CPTII,S$GLB,, | Performed by: STUDENT IN AN ORGANIZED HEALTH CARE EDUCATION/TRAINING PROGRAM

## 2023-11-01 PROCEDURE — 3008F PR BODY MASS INDEX (BMI) DOCUMENTED: ICD-10-PCS | Mod: CPTII,S$GLB,, | Performed by: STUDENT IN AN ORGANIZED HEALTH CARE EDUCATION/TRAINING PROGRAM

## 2023-11-01 PROCEDURE — 1159F MED LIST DOCD IN RCRD: CPT | Mod: CPTII,S$GLB,, | Performed by: STUDENT IN AN ORGANIZED HEALTH CARE EDUCATION/TRAINING PROGRAM

## 2023-11-01 PROCEDURE — 88175 CYTOPATH C/V AUTO FLUID REDO: CPT | Performed by: STUDENT IN AN ORGANIZED HEALTH CARE EDUCATION/TRAINING PROGRAM

## 2023-11-01 PROCEDURE — 3044F HG A1C LEVEL LT 7.0%: CPT | Mod: CPTII,S$GLB,, | Performed by: STUDENT IN AN ORGANIZED HEALTH CARE EDUCATION/TRAINING PROGRAM

## 2023-11-01 NOTE — PROGRESS NOTES
Chief Complaint: Well Woman Exam     HPI:      Dione Egan is a 53 y.o.  who presents today for well woman exam. Prior care with Dr. Kinney. Occasional cycles with IUD. No GYN issues, problems, or complaints. Reports bloating/constipation for past year, now improved since taking probiotic. Specifically, patient denies abnormal vaginal discharge, pelvic pain, urinary problems. Ms Egan is sexually active. Using Mirena IUD for contraception, inserted 2020.    Previous Pap: NILM (2020)  Previous Mammogram: BiRads: 1 T-C Score: 16.43% (23)   Most Recent Colonoscopy: cologuard negative , repeat     Past Medical History:   Diagnosis Date    COURTNEY II (cervical intraepithelial neoplasia II)     KJB---LEEP    CIS (carcinoma in situ of cervix)     Dermoid cyst of ovary     LEFT <1 CM     Past Surgical History:   Procedure Laterality Date    CERVICAL BIOPSY  W/ LOOP ELECTRODE EXCISION      KJB---COURTNEY II    CERVICAL CONIZATION   W/ LASER      CK    INTRAUTERINE DEVICE INSERTION      MIRENA    REMOVAL OF INTRAUTERINE DEVICE (IUD)      OFFICE HYSTROSCOPY     Social History     Socioeconomic History    Marital status:    Tobacco Use    Smoking status: Former     Current packs/day: 0.00     Average packs/day: 0.3 packs/day for 5.0 years (1.3 ttl pk-yrs)     Types: Cigarettes     Start date: 1988     Quit date: 1992     Years since quittin.8    Smokeless tobacco: Never   Substance and Sexual Activity    Alcohol use: No    Drug use: No    Sexual activity: Yes     Partners: Male     Birth control/protection: I.U.D.     Family History   Problem Relation Age of Onset    Breast cancer Paternal Aunt     Cancer Father         Prostate    Hyperlipidemia Mother     Colon cancer Neg Hx     Ovarian cancer Neg Hx      Review of patient's allergies indicates:   Allergen Reactions    Vicodin [hydrocodone-acetaminophen] Shortness Of Breath     OB History         "  3    Para   2    Term   2            AB        Living             SAB        IAB        Ectopic        Multiple        Live Births                   Physical Exam:      PHYSICAL EXAM:  /75   Ht 5' 4" (1.626 m)   Wt 52.6 kg (115 lb 15.4 oz)   LMP  (LMP Unknown)   BMI 19.90 kg/m²   Body mass index is 19.9 kg/m².     APPEARANCE: Well nourished, well developed, in no acute distress.  PSYCH: Appropriate mood and affect.  SKIN: No acne or hirsutism  NECK: Neck symmetric without masses  NODES: No inguinal, axillary, or supraclavicular lymph node enlargement  ABDOMEN: Soft.  No tenderness or masses.    CARDIOVASCULAR: No edema of peripheral extremities  BREASTS: Symmetrical, no visible skin lesions. No palpable masses. No nipple discharge bilaterally.  PELVIC: Normal external genitalia without lesions.  Normal hair distribution.  Adequate perineal body, normal urethral meatus.  Vagina moist and well rugated. Without lesions. Without discharge.  Cervix pink, without lesions, discharge or tenderness. IUD strings 1 cm per os. No significant cystocele or rectocele.  Bimanual exam shows uterus to be normal size, regular, mobile and nontender.  Adnexa without masses or tenderness.      Assessment/Plan:     Well woman exam  -     Ambulatory referral/consult to Obstetrics / Gynecology    Encounter for Papanicolaou smear for cervical cancer screening  -     Liquid-Based Pap Smear, Screening    Encounter for screening for human papillomavirus (HPV)  -     HPV High Risk Genotypes, PCR      - pelvic exam normal  - IUD strings visualized  - pap/hpv collected (h/o LEEP in 2016: CIN2 with neg margins)  - MMG, colon ca screening and routine screening labs all up to date with PCP  - RTC 1 yr for annual or sooner if needed     Counseling:     Patient was counseled today on current ASCCP pap guidelines, the recommendation for yearly physical exams, safe driving habits, breast self awareness and annual mammograms. She is to " see her PCP for other health maintenance.     Use of the [facility] Patient Portal discussed and encouraged during today's visit.     Doris Rascon MD  Obstetrics and Gynecology  [facility]

## 2023-11-03 LAB
HPV HR 12 DNA SPEC QL NAA+PROBE: NEGATIVE
HPV16 AG SPEC QL: NEGATIVE
HPV18 DNA SPEC QL NAA+PROBE: NEGATIVE

## 2023-11-08 DIAGNOSIS — N76.0 BV (BACTERIAL VAGINOSIS): Primary | ICD-10-CM

## 2023-11-08 DIAGNOSIS — B96.89 BV (BACTERIAL VAGINOSIS): Primary | ICD-10-CM

## 2023-11-08 LAB
FINAL PATHOLOGIC DIAGNOSIS: NORMAL
Lab: NORMAL

## 2023-11-08 RX ORDER — METRONIDAZOLE 500 MG/1
500 TABLET ORAL 2 TIMES DAILY
Qty: 14 TABLET | Refills: 0 | Status: SHIPPED | OUTPATIENT
Start: 2023-11-08 | End: 2024-02-06

## 2024-01-09 ENCOUNTER — PATIENT MESSAGE (OUTPATIENT)
Dept: INTERNAL MEDICINE | Facility: CLINIC | Age: 54
End: 2024-01-09
Payer: COMMERCIAL

## 2024-02-06 ENCOUNTER — OFFICE VISIT (OUTPATIENT)
Dept: INTERNAL MEDICINE | Facility: CLINIC | Age: 54
End: 2024-02-06
Payer: COMMERCIAL

## 2024-02-06 DIAGNOSIS — K59.01 SLOW TRANSIT CONSTIPATION: ICD-10-CM

## 2024-02-06 DIAGNOSIS — G56.01 CARPAL TUNNEL SYNDROME OF RIGHT WRIST: Primary | ICD-10-CM

## 2024-02-06 PROCEDURE — 1160F RVW MEDS BY RX/DR IN RCRD: CPT | Mod: CPTII,95,, | Performed by: INTERNAL MEDICINE

## 2024-02-06 PROCEDURE — 1159F MED LIST DOCD IN RCRD: CPT | Mod: CPTII,95,, | Performed by: INTERNAL MEDICINE

## 2024-02-06 PROCEDURE — 99213 OFFICE O/P EST LOW 20 MIN: CPT | Mod: 95,,, | Performed by: INTERNAL MEDICINE

## 2024-02-06 NOTE — PROGRESS NOTES
Assessment & Plan  Problem List Items Addressed This Visit    None  Visit Diagnoses       Carpal tunnel syndrome of right wrist    -  Primary  -    RICE therapy.  Ibuprofen.  Counseled on ergonomic changes and wearing carpal tunnel wrist braces at night.  Discussed that she very well may need to wear these braces for several months for her symptoms totally go away.  Discussed that next steps would include evaluation for injection    Slow transit constipation    -  She has already increase her dietary fiber intake.  She has taking a probiotic supplement without clear benefit and I advised her it is okay to stop this.  We did discuss increasing her hydration.  Also discussed increasing exercise.  She has taking an over-the-counter stool softener with good benefits and we discussed the safety if this              Health Maintenance reviewed, has upcoming PCP visit.    The patient location is:  Patient workplace   The chief complaint leading to consultation is: noted below  Visit type: Virtual visit with synchronous audio and video  Total time spent with patient: 15  Each patient to whom he or she provides medical services by telemedicine is:  (1) informed of the relationship between the physician and patient and the respective role of any other health care provider with respect to management of the patient; and (2) notified that he or she may decline to receive medical services by telemedicine and may withdraw from such care at any time.    Follow-up: Follow up if symptoms worsen or fail to improve.    ______________________________________________________________________    Chief Complaint  Chief Complaint   Patient presents with    Wrist Pain     Right       HPI  Dione Egan is a 53 y.o. female with multiple medical diagnoses as listed in the medical history and problem list that presents for right wrist pain x 2 days via virtual visit.  Their last appointment 8/16/2023.      Has been waking at night with right hand  pain.  It burns when she wakes up.  Has trouble with opening a pill bottle when it flares.  She does a lot of work on her house and was recently painting a lot.  No weakness in the hand.  Pain is palmar.  No atrophy noted.  She does note that she sometimes sleeps in a fetal position we will find this is when her hand particularly hurts her.        ROS  Review of Systems   Constitutional:  Negative for activity change and unexpected weight change.   HENT:  Negative for hearing loss, rhinorrhea and trouble swallowing.    Eyes:  Negative for discharge and visual disturbance.   Respiratory:  Negative for chest tightness and wheezing.    Cardiovascular:  Negative for chest pain and palpitations.   Gastrointestinal:  Positive for constipation. Negative for blood in stool, diarrhea and vomiting.   Endocrine: Negative for polydipsia and polyuria.   Genitourinary:  Negative for difficulty urinating, dysuria, hematuria and menstrual problem.   Musculoskeletal:  Positive for arthralgias and joint swelling. Negative for neck pain.   Neurological:  Negative for weakness and headaches.   Psychiatric/Behavioral:  Negative for confusion and dysphoric mood.            Physical Exam  Physical Exam  Constitutional:       General: She is not in acute distress.     Appearance: She is well-developed.   HENT:      Head: Normocephalic and atraumatic.   Eyes:      Conjunctiva/sclera: Conjunctivae normal.   Pulmonary:      Effort: Pulmonary effort is normal. No respiratory distress.   Musculoskeletal:      Comments: Patient directed on a self examination using Tinel's.  It was negative    She was also directed on holding her wrist in a hyperflexed position.  This resulted in return of the symptoms she is describing in the right hand after roughly 10-15 seconds   Neurological:      Mental Status: She is alert and oriented to person, place, and time.      Comments: Symmetric facial movements   Psychiatric:         Behavior: Behavior normal.          Thought Content: Thought content normal.

## 2024-02-06 NOTE — PATIENT INSTRUCTIONS
Carpal Tunnel wrist brace    Ice the wrist when you have pain.  Ibuprofen is safe to take for mild pain.

## 2024-02-19 ENCOUNTER — PATIENT MESSAGE (OUTPATIENT)
Dept: ADMINISTRATIVE | Facility: OTHER | Age: 54
End: 2024-02-19
Payer: COMMERCIAL

## 2024-06-10 ENCOUNTER — PATIENT MESSAGE (OUTPATIENT)
Dept: INTERNAL MEDICINE | Facility: CLINIC | Age: 54
End: 2024-06-10
Payer: COMMERCIAL

## 2024-06-10 ENCOUNTER — RESEARCH ENCOUNTER (OUTPATIENT)
Dept: RESEARCH | Facility: HOSPITAL | Age: 54
End: 2024-06-10
Payer: COMMERCIAL

## 2024-06-10 NOTE — PROGRESS NOTES
Subject was seen in clinic today and consented for the following study:     [facility] Cognitive Assessment (NOCA)  IRB #:2023.258  PI: Marc Lenz MD  : Abigail GillilandMarlena  Subject#: [id number]     Informed Consent Process  Present for discussion: Participant, Abigail Santiago   Is LAR Consenting for Subject: No     Prior to the Informed Consent (IC) being signed, or any protocol required testing, procedure, or intervention being performed, the following was done or discussed:  Purpose of the Study, Qualifications to Participate: Yes  Study Design, Schedule and Procedures: Yes  Risks, Benefits, Alternative Treatments, Compensation and Costs: Yes  Confidentiality and HIPAA Authorization for Release of Medical Records for the research trial/subject's right/study related injury: Yes  Study related contact information: Yes  Voluntary Participation and Withdrawal from the research trial at any time: Yes  Optional samples/procedures (if applicable): Yes  Patient has been offered the opportunity to ask questions regarding the study and all questions were answered satisfactorily: Yes  Patient verbalizes understanding of the study/procedures and agrees to participate: Yes  CRC and PI contact information given to patient: Yes  Signed copy given to patient: Yes  Copy in patient's chart and original uploaded to Epic: Yes     Person Obtaining Consent: Joe Iyer     Did Participant Consent? Yes  Version: Version 1 01 Dec 2023 (Approved 6/3/2024)  Date: 10-Puneet-24  Time: 10:27 AM    [facility] [place]: [phone]     Ms. Dione Egan  was seen today in clinic for the NOCA study.    Screening of inclusion/exclusion criteria evaluation has been reviewed by Abigail Sewell, Joe Iyer, and Dr. Marc Lenz. At this time, the subject meets all inclusion and does not meet any one of the exclusion criteria.         Inclusion Criteria:     Proficiency in English to ensure clarity in task instructions  and effective communication during the assessment. Yes  Possession of normal vision or vision corrected to standard, crucial for proper task execution.Yes  Cognitive capacities consistent with normative data for their age, verified via an eligibility questionnaire and neurologist evaluation.Yes    Exclusion Criteria:    Individuals previously diagnosed with cognitive impairment or demonstrating cognitive impairment at time of evaluation, including below-threshold MOCA scores, medical history influencing cognitive function, or signs of significant cognitive decline No  Non-English speaking individuals. No  Those residing outside the designated geographic area.No  Any other factors, medical or otherwise, identified by the evaluation team as potential barriers to successful task completion. No    Administration of the Cognitive Capacity Baseline Screening Evaluation:    Participants will complete the Cognitive Capacity Baseline Screening Evaluation upon agreement.  The evaluation form includes sections on educational background, learning history, neurodivergent patterns, relevant medical history, subjective cognitive assessment, and results from the standard Demetrius Cognitive Assessment (MOCA) test.  Evaluations will be conducted in a quiet, distraction-free setting.    Was Form Completed?  Yes    Question Yes No Comments   Can you provide details about the participant's early development and milestones? [] [x] N/A   Does the participant have any history of learning differences, such as dyslexia or ADHD? [] [x] N/A   What is the participant's educational background? Did they face any challenges or achieve any notable successes? [] [x] 22 years (2 bach & 1 masters)   How many years of schooling has the participant completed? [x] [] 22 years (2 bach & 1 masters)   Can you describe the participant's career and any special skills they have developed? [] [x] N/A   How well has the participant's job matched their abilities  and thinking style? [x] [] No issues   Is the participant currently retired? [] [x] N/A   Does the participant have a past medical history? [] [x] N/A   Does the participant have any known genetic conditions related to their current health? [] [x] N/A   Can you provide details about any surgeries the participant has had in the past? [] [x] Nothing major    Has the participant ever had a serious head injury or concussion? [] [x] N/A   Has the participant ever had any brain or spine surgeries? [] [x] N/A   Has the participant been exposed to harmful chemicals or had issues with substance abuse? [] [x] N/A   Has the participant ever suffered from poor nutrition or vitamin deficiencies? [] [x] Vitamin D but no significant nutrition/ vitamin deficiencies    Does the participant have a history of long-term stress or mood disorders? [] [x] N/A   Has the participant been diagnosed with any chronic inflammatory conditions? [] [x] N/A   Can you provide any relevant information about the participant's family history? [] [x] N/A   Is there any family history of memory or thinking problems? [] [x] N/A   Is there any family history of movement disorders like Parkinson's? [x] [] Paternal grandfather had PD (age of onset ~ 80 years)   Is there any family history of motor neuron diseases like ALS? [] [x] N/A   Is there any family history of developmental or learning differences? [x] [] Brother had undiagnosed Dyslexia   Is there any history of mental health issues in the participant's family? [] [x] N/A   Has the participant noticed any changes in their memory recently? [] [x] No issues    Does the participant often repeat themselves or ask the same questions over and over? [] [x] N/A   Does the participant have trouble remembering important conversations? [] [x] No issues    Does the participant have difficulty remembering recent events? [] [x] No issues    Does the participant forget things shortly after being told? [] [x] No  issues    Can the participant remember things that happened recently? [x] [] No issues    Can the participant remember things that happened a long time ago? [x] [] No issues    How would you describe the participant's ability to focus and pay attention? [] [x] No issues    Does the participant's ability to focus vary throughout the day? [] [x] No issues    Does the participant have trouble focusing on one thing at a time? [] [x] No issues    Is the participant easily distracted? [] [x] No issues    Does the participant have trouble paying attention to more than one thing at a time? [] [x] No issues    Has the participant's thinking speed slowed down? [] [x] No issues    Does the participant have trouble keeping track of information in their head? [] [x] No issues    Does the participant frequently lose personal items like keys or a phone? [] [x] No issues    Does the participant have trouble keeping track of their medications? [] [x] No issues    Does the participant struggle with planning, organizing, or completing tasks that have multiple steps? [] [x] No issues    Does the participant have trouble focusing on important tasks? [] [x] No issues    Does the participant have difficulty adjusting their thinking in new situations? [] [x] No issues    Does the participant have trouble controlling their impulses or actions? [] [x] No issues    Has the participant shown any new impulsive or reckless behavior? [] [x] No issues    Is the participant's judgment sound? [x] [] No issue with Judgement    Has there been any change in the participant's speech? [] [x] No issue with Speech   Does the participant forget people's names more often? [] [x] No issues    Does the participant have trouble finding the right words? [] [x] No issues    Is the participant's speech fluent and smooth? [x] [] No issues    Is the participant's speech grammatically correct? [x] [] No issues    Does the participant use incorrect words by mistake?  [] [x] No issues    Does the participant have difficulty reading? [] [x] No issues    Does the participant have trouble understanding what they read or hear? [] [x] No issues    Has the participant experienced any new problems with visual tasks? [] [x] No issues    Does the participant get confused or lost in new places? [] [x] No issues    Does the participant have trouble finding their way around? [] [x] No issues    Does the participant get lost in familiar places? [] [x] No issues    Does the participant have trouble recognizing familiar objects or faces? [] [x] No issues    Does the participant have any problems with driving or parking? [] [x] No issues    Does the participant have trouble walking? [] [x] No issues    Does the participant feel unsteady or imbalanced? [] [x] No issues    Has the participant experienced any falls? [] [x] N/A   Does the participant have new muscle weakness? [] [x] No issues    Does the participant have trouble with small movements like buttoning a shirt or using tools? [] [x] No issues    Has the participant's handwriting changed, becoming smaller or shakier? [] [x] No issues    Does the participant have a tremor when resting? [] [x] N/A   Has the participant experienced new muscle cramps or twitching? [] [x] N/A   Has the participant experienced any new numbness, tingling, or pain? [] [x] N/A   Has the participant noticed any changes in vision, such as blurriness or double vision? [] [x] N/A   Has the participant experienced any new hearing loss or worsening tinnitus (ringing in the ears)? [] [x] N/A   Does the participant have a reduced sense of smell? [] [x] N/A   Does the participant have trouble sleeping? [x] [] Ongoing issue -- nothing new. Issue w/ falling and staying asleep. Has taken melatonin and benadryl in the past. Takes about ah hour to fall back asleep (wakes up 1AM/2 AM). 5 mg of melatonin everyday. PI recommended to stop taking the melatonin to once every 6 weeks.     Does the participant have trouble falling asleep? [x] [] Yes   Does the participant wake up frequently during the night? [x] [] Yes   Does the participant snore or has anyone noticed they stop breathing while asleep? [] [x] No issues    Does the participant feel well-rested upon waking in the morning? [x] [] No issues    Does the participant act out their dreams while sleeping? [] [x] No issues    Does the participant have symptoms that suggest restless leg syndrome, such as uncomfortable sensations in the legs? [] [x] No issues    Has the participant's personality changed? [] [x] No issues    Does the participant feel depressed? [] [x] No issues    Does the participant avoid social interactions or seem indifferent? [] [x] No issues    Does the participant feel anxious? [] [x] No issues    Does the participant have mood swings? [] [x] No issues    Does the participant have hyperactive behavior? [] [x] No issues    Does the participant have thoughts that people are out to get them (paranoia)? [] [x] No issues    Does the participant have false beliefs (delusions)? [] [x] No issues    Does the participant see or hear things that aren't there (hallucinations)? [] [x] No issues    Does the participant have a history of bad reactions to psychiatric medications? [] [x] None that they know of    Does the participant experience constipation? [x] [] Some issues, but resolved. Feelings of bloating but assumed it was because of menopause (takes stool softener)    Does the participant have trouble controlling their bladder? [] [x] No issues    Does the participant feel dizzy or lightheaded when standing up? [] [x] No issues    Has the participant's weight stayed the same? [] [x] No issues      Best subject: Science & Math  Worst: Chemistry   Degrees: Linguistics & Romance languages. 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   Brother had undiagnosed Dyslexia.       Was MoCA Test Completed?  Yes  Performed by: Joe Iyer   Date:  10-Puneet-24  Version: 8.1  Score: 29/30    Physician's Interview:    Each participant will undergo a one-on-one interview with a physician following the self-assessment.  The interview aims to explore responses further and allow the physician to observe the participant's cognitive function, attention, focus, and any behavioral or cognitive anomalies.  Participants will be deemed cognitively normal per the neurologist's or expert professional's judgment, supplemented by their MOCA test results.    Subject was seen in the AD clinic by Dr. Lenz for PI assessments. See provider's Epic Note for details.      Data Recording and Storage:    All responses and observations will be meticulously recorded.  Data will be securely stored on [facility]'s HIPAA-compliant systems.    The subject was compensated in the amount of $25 for this visit. They have access to [employer] previously issued.      Ms. Dione Egan completed the NOCA visit per protocol and was thanked for their time and participation.   Ms. Dione Egan was reminded to call CRC should there be any questions about this clinical trial. Subject was reminded to call if any adverse events are observed.

## 2024-06-14 ENCOUNTER — OFFICE VISIT (OUTPATIENT)
Dept: FAMILY MEDICINE | Facility: CLINIC | Age: 54
End: 2024-06-14
Payer: COMMERCIAL

## 2024-06-14 VITALS
DIASTOLIC BLOOD PRESSURE: 62 MMHG | BODY MASS INDEX: 18.6 KG/M2 | SYSTOLIC BLOOD PRESSURE: 110 MMHG | HEART RATE: 66 BPM | WEIGHT: 108.94 LBS | HEIGHT: 64 IN | OXYGEN SATURATION: 99 %

## 2024-06-14 DIAGNOSIS — M99.04 SOMATIC DYSFUNCTION OF SACRAL REGION: ICD-10-CM

## 2024-06-14 DIAGNOSIS — M99.03 SOMATIC DYSFUNCTION OF LUMBAR REGION: ICD-10-CM

## 2024-06-14 DIAGNOSIS — M79.18 PIRIFORMIS MUSCLE PAIN: Primary | ICD-10-CM

## 2024-06-14 DIAGNOSIS — M85.852 OSTEOPENIA OF BOTH HIPS: ICD-10-CM

## 2024-06-14 DIAGNOSIS — M99.05 SOMATIC DYSFUNCTION OF PELVIS REGION: ICD-10-CM

## 2024-06-14 DIAGNOSIS — M99.02 SOMATIC DYSFUNCTION OF SPINE, THORACIC: ICD-10-CM

## 2024-06-14 DIAGNOSIS — M85.851 OSTEOPENIA OF BOTH HIPS: ICD-10-CM

## 2024-06-14 PROCEDURE — 3008F BODY MASS INDEX DOCD: CPT | Mod: CPTII,S$GLB,, | Performed by: STUDENT IN AN ORGANIZED HEALTH CARE EDUCATION/TRAINING PROGRAM

## 2024-06-14 PROCEDURE — 98926 OSTEOPATH MANJ 3-4 REGIONS: CPT | Mod: S$GLB,,, | Performed by: STUDENT IN AN ORGANIZED HEALTH CARE EDUCATION/TRAINING PROGRAM

## 2024-06-14 PROCEDURE — 1160F RVW MEDS BY RX/DR IN RCRD: CPT | Mod: CPTII,S$GLB,, | Performed by: STUDENT IN AN ORGANIZED HEALTH CARE EDUCATION/TRAINING PROGRAM

## 2024-06-14 PROCEDURE — 3078F DIAST BP <80 MM HG: CPT | Mod: CPTII,S$GLB,, | Performed by: STUDENT IN AN ORGANIZED HEALTH CARE EDUCATION/TRAINING PROGRAM

## 2024-06-14 PROCEDURE — 99999 PR PBB SHADOW E&M-EST. PATIENT-LVL III: CPT | Mod: PBBFAC,,, | Performed by: STUDENT IN AN ORGANIZED HEALTH CARE EDUCATION/TRAINING PROGRAM

## 2024-06-14 PROCEDURE — 1159F MED LIST DOCD IN RCRD: CPT | Mod: CPTII,S$GLB,, | Performed by: STUDENT IN AN ORGANIZED HEALTH CARE EDUCATION/TRAINING PROGRAM

## 2024-06-14 PROCEDURE — 3074F SYST BP LT 130 MM HG: CPT | Mod: CPTII,S$GLB,, | Performed by: STUDENT IN AN ORGANIZED HEALTH CARE EDUCATION/TRAINING PROGRAM

## 2024-06-14 PROCEDURE — 99214 OFFICE O/P EST MOD 30 MIN: CPT | Mod: 25,S$GLB,, | Performed by: STUDENT IN AN ORGANIZED HEALTH CARE EDUCATION/TRAINING PROGRAM

## 2024-06-14 NOTE — PROGRESS NOTES
Progress Note  [facility]    Subjective:     Dione Egan is a 53 y.o. year old female who presents to clinic for gluteal pain    Back pain: Has had this in the past and saw a DO in [place] who adjusted her and helped her greatly. Has come back recently. Has a standing desk and does exercises and stretching every morning which helps. Mainly left gluteal. Pain has never shot down the leg. Exercises and sitting all day can make it worse. No injuries to back or pelvis. Does have osteopenia in the hips.     Patient Active Problem List    Diagnosis Date Noted    Wears contact lenses 2021    Familial hypercholesteremia 2018    Vitamin D deficiency 2018        Review of patient's allergies indicates:   Allergen Reactions    Vicodin [hydrocodone-acetaminophen] Shortness Of Breath        Past Medical History:   Diagnosis Date    COURTNEY II (cervical intraepithelial neoplasia II)     [facility]---LEEP    CIS (carcinoma in situ of cervix)     Dermoid cyst of ovary     LEFT <1 CM      Past Surgical History:   Procedure Laterality Date    CERVICAL BIOPSY  W/ LOOP ELECTRODE EXCISION      [facility]---COURTNEY II    CERVICAL CONIZATION   W/ LASER      [facility]    INTRAUTERINE DEVICE INSERTION      MIRENA    REMOVAL OF INTRAUTERINE DEVICE (IUD)  2020    OFFICE HYSTROSCOPY      Family History   Problem Relation Name Age of Onset    Breast cancer Paternal Aunt      Cancer Father Aditya Egan         Prostate    Hyperlipidemia Mother      Colon cancer Neg Hx      Ovarian cancer Neg Hx        Social History     Tobacco Use    Smoking status: Former     Current packs/day: 0.00     Average packs/day: 0.3 packs/day for 5.0 years (1.3 ttl pk-yrs)     Types: Cigarettes     Start date: 1988     Quit date: 1992     Years since quittin.4    Smokeless tobacco: Never   Substance Use Topics    Alcohol use: No        Objective:     Vitals:    24 1115   BP: 110/62   BP  "Location: Left arm   Patient Position: Sitting   BP Method: Large (Manual)   Pulse: 66   SpO2: 99%   Weight: 49.4 kg (108 lb 14.5 oz)   Height: 5' 4" (1.626 m)     BMI: 18.69    Gen: No apparent distress, well nourished and developed, appears stated age  CV: no LE edema  Resp: normal respiratory effort  MSK: TTP L piriformis, posteriorly rotated pelvis on the L, hypertonicity of paraspinal musculature in the lumbar and thoracic spine, anterior rotated sacrum    OMM: L posterior pelvis muscle energy, L piriformis counterstrain, anterior sacrum muscle energy, lumbar and thoracic paraspinal bowstringing, thoracic MFR.    OMM and treatment techniques discussed with the patient. Risks and benefits also discussed, overall treatments have low risk of poor outcomes aside from possibility of not improving symptoms and soreness/discomfort during treatment. All questions answered and pt provided verbal consent for treatment. Pt tolerated OMT well with improvement in symptoms after treatment.      Assessment/Plan:     Dione Egan is a 53 y.o. year old female who presents to clinic for gluteal pain    1. Piriformis muscle pain  Home exercises provided. Tolerated OMM well    2. Osteopenia of both hips  HVLA techniques deferred given osteopenia status. Pt verbalized understanding and was in agreement with the plan.      3. Somatic dysfunction of spine, thoracic  Pt tolerated procedure well with marked improvement in symptoms. Gave exercises and stretches to be done at home to prevent recurrence. Pt verbalized understanding.      4. Somatic dysfunction of lumbar region  Pt tolerated procedure well with marked improvement in symptoms. Gave exercises and stretches to be done at home to prevent recurrence. Pt verbalized understanding.      5. Somatic dysfunction of pelvis region  Pt tolerated procedure well with marked improvement in symptoms. Gave exercises and stretches to be done at home to prevent recurrence. Pt verbalized " understanding.      6. Somatic dysfunction of sacral region  Pt tolerated procedure well with marked improvement in symptoms. Gave exercises and stretches to be done at home to prevent recurrence. Pt verbalized understanding.       Follow-up:PRN    I spent a total of 35 minutes on the day of the visit.This includes face to face time and non-face to face time preparing to see the patient (eg, review of tests), obtaining and/or reviewing separately obtained history, documenting clinical information in the electronic or other health record, independently interpreting results and communicating results to the patient/family/caregiver, or care coordinator.      Inderjit Tenorio, DO  Family Medicine

## 2024-07-10 ENCOUNTER — ON-DEMAND VIRTUAL (OUTPATIENT)
Dept: URGENT CARE | Facility: CLINIC | Age: 54
End: 2024-07-10
Payer: COMMERCIAL
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
## 2024-07-10 RX ORDER — AMOXICILLIN AND CLAVULANATE POTASSIUM 875; 125 MG/1; MG/1
1 TABLET, FILM COATED ORAL 2 TIMES DAILY
Qty: 10 TABLET | Refills: 0 | Status: SHIPPED | OUTPATIENT
Start: 2024-07-10 | End: 2024-07-17

## 2024-07-10 RX ORDER — MUPIROCIN 20 MG/G
OINTMENT TOPICAL 3 TIMES DAILY
Qty: 22 G | Refills: 0 | Status: SHIPPED | OUTPATIENT
Start: 2024-07-10

## 2024-07-10 NOTE — PROGRESS NOTES
Subjective:      Patient ID: Dione Egan is a 53 y.o. female.    Vitals:  vitals were not taken for this visit.     Chief Complaint: Animal Bite (Stray cat)      Visit Type: TELE AUDIOVISUAL    Present with the patient at the time of consultation: TELEMED PRESENT WITH PATIENT: None    Past Medical History:   Diagnosis Date    COURTNEY II (cervical intraepithelial neoplasia II) 2016    KJB---LEEP    CIS (carcinoma in situ of cervix) 2004    Dermoid cyst of ovary 2020    LEFT <1 CM     Past Surgical History:   Procedure Laterality Date    CERVICAL BIOPSY  W/ LOOP ELECTRODE EXCISION  2016    KJB---COURTNEY II    CERVICAL CONIZATION   W/ LASER  2006    [facility]    INTRAUTERINE DEVICE INSERTION  2015/2020    MIRENA    REMOVAL OF INTRAUTERINE DEVICE (IUD)  2020    OFFICE HYSTROSCOPY     Review of patient's allergies indicates:   Allergen Reactions    Vicodin [hydrocodone-acetaminophen] Shortness Of Breath     No current outpatient medications on file prior to visit.     Current Facility-Administered Medications on File Prior to Visit   Medication Dose Route Frequency Provider Last Rate Last Admin    levonorgestreL 20 mcg/24 hours (5 yrs) 52 mg IUD 1 Intra Uterine Device  1 Intra Uterine Device Intrauterine  Daljit Kinney Jr., MD   1 Intra Uterine Device at 08/24/20 1300     Family History   Problem Relation Name Age of Onset    Breast cancer Paternal Aunt      Cancer Father Aditya Egan         Prostate    Hyperlipidemia Mother      Colon cancer Neg Hx      Ovarian cancer Neg Hx         Medications Ordered                [facility] [address], [place] [address]    Telephone: [phone]   Fax: [phone]   Hours: Always Open                         Internal Pharmacy (2 of 2)              amoxicillin-clavulanate 875-125mg (AUGMENTIN) 875-125 mg per tablet    Sig: Take 1 tablet by mouth 2 (two) times daily. for 5 days       Start: 7/10/24     Quantity: 10 tablet Refills: 0                          mupirocin (BACTROBAN) 2 % ointment    Sig: Apply topically 3 (three) times daily.       Start: 7/10/24     Quantity: 22 g Refills: 0                           Ohs Peq Odvv Intake    7/10/2024 12:09 PM CDT - Filed by Patient   What is your current physical address in the event of a medical emergency? [address] [place] [address]   Are you able to take your vital signs? Yes   Systolic Blood Pressure: 122   Diastolic Blood Pressure: 82   Weight: 112   Height: 65   Pulse: 58   Temperature: 98   Respiration rate: 16   Pulse Oxygen: 100   Please attach any relevant images or files          Patient was trying to help a cat that was stuck under her house in [place] yesterday afternoon. The cat then bit and scratched her. She was able to cleanse the area thoroughly and apply neosporin. She is concerned for rabies exposure. She reports that she has no associated symptoms and otherwise feels healthy and well.     Animal Bite   The incident occurred yesterday. The incident occurred at home. There is an injury to the Left hand. The patient is experiencing no pain. It is unlikely that a foreign body is present. Pertinent negatives include no chest pain, no fussiness, no numbness, no visual disturbance, no abdominal pain, no bowel incontinence, no nausea, no vomiting, no bladder incontinence, no headaches, no hearing loss, no inability to bear weight, no neck pain, no pain when bearing weight, no focal weakness, no decreased responsiveness, no light-headedness, no loss of consciousness, no seizures, no tingling, no weakness, no cough, no difficulty breathing and no memory loss. There have been no prior injuries to these areas. Her tetanus status is out of date. She has been Behaving normally. There were no sick contacts. She has received no recent medical care.       Constitution: Negative for fever.   HENT:  Negative for hearing loss.    Neck: Negative for neck pain.   Cardiovascular:  Negative for chest pain.    Respiratory:  Negative for cough.    Gastrointestinal:  Negative for abdominal pain, nausea, vomiting and bowel incontinence.   Genitourinary:  Negative for bladder incontinence.   Skin:  Positive for wound (left hand between thumb and pointer finger). Negative for erythema.   Neurological:  Negative for light-headedness, focal weakness, headaches, loss of consciousness, numbness and seizures.        Objective:   The physical exam was conducted virtually.  Physical Exam   Musculoskeletal:      Left hand: She exhibits laceration.   Skin: Lacerations - upper ext.:  left handNo abrasion, No bruising, No erythema and No ecchymosis   less than 1/2 in linear wound to left hand    Assessment:     1. Cat bite, initial encounter        Plan:       Cat bite, initial encounter  -     mupirocin (BACTROBAN) 2 % ointment; Apply topically 3 (three) times daily.  Dispense: 22 g; Refill: 0  -     amoxicillin-clavulanate 875-125mg (AUGMENTIN) 875-125 mg per tablet; Take 1 tablet by mouth 2 (two) times daily. for 5 days  Dispense: 10 tablet; Refill: 0  -     DIPH,PERTUSS(ACEL),TET VAC(PF)(ADULT)(ADACEL)(TDaP) - told patient to get this through her PCP office  -     Spoke with Dr. Rylee Mariee, IZABEL from [facility] about rabies protocol and she provided the following recommendation: PEP consists of HRIG and a rabies vaccine on day 0, followed by rabies vaccines on day 3, 7, and 14.  If animal control can capture and quarantine the kitten and it makes it through the 10 day quarantine, no PEP is needed.  If the kitten can not be caught, or is lost in any way prior to 10 days, PEP is indicated. Facilities that administer the PEP regimen: [facility], [facility]  -    All information has been relayed to the patient.

## 2024-07-10 NOTE — PATIENT INSTRUCTIONS
The following [facility] and [facility] have informed the Infectious Disease Epidemiology Section (IDEpi) at [facility] that these facilities are capable of administering complete post exposure rabies prophylaxis, including administration of human rabies immunoglobulin (HRIG) and the complete vaccine series. If a hospital or clinic has been inadvertently omitted from this list, please contact IDEpi at [phone].     PEP consists of HRIG and a rabies vaccine on day 0, followed by rabies vaccines on day 3, 7, and 14.  If animal control can capture and quarantine the kitten and it makes it through the 10 day quarantine, no PEP is needed.  If the kitten can not be caught, or is lost in any way prior to 10 days, PEP is indicated.     REGION 1:   [facility]   [address] [facility] [place] [address] [phone]    [facility]   [address] [place] [address] [phone]    [facility] [place] [address] [place] [address] [phone]     [facility]  [address] [place] [address] [phone]

## 2024-07-11 ENCOUNTER — HOSPITAL ENCOUNTER (EMERGENCY)
Facility: HOSPITAL | Age: 54
Discharge: HOME | End: 2024-07-11
Attending: EMERGENCY MEDICINE
Payer: COMMERCIAL

## 2024-07-11 VITALS
BODY MASS INDEX: 18.66 KG/M2 | TEMPERATURE: 98 F | DIASTOLIC BLOOD PRESSURE: 70 MMHG | WEIGHT: 112 LBS | HEIGHT: 65 IN | SYSTOLIC BLOOD PRESSURE: 117 MMHG | HEART RATE: 58 BPM | RESPIRATION RATE: 16 BRPM | OXYGEN SATURATION: 100 %

## 2024-07-11 DIAGNOSIS — Z29.14 ENCOUNTER FOR PROPHYLACTIC ADMINISTRATION OF RABIES IMMUNE GLOBULIN: ICD-10-CM
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
## 2024-07-11 DIAGNOSIS — Z20.3 NEED FOR POST EXPOSURE PROPHYLAXIS FOR RABIES: Primary | ICD-10-CM

## 2024-07-11 PROCEDURE — 90675 RABIES VACCINE IM: CPT | Mod: JZ,JG

## 2024-07-11 PROCEDURE — 99284 EMERGENCY DEPT VISIT MOD MDM: CPT | Mod: 25

## 2024-07-11 PROCEDURE — 90375 RABIES IG IM/SC: CPT | Mod: JZ,JG

## 2024-07-11 PROCEDURE — 90471 IMMUNIZATION ADMIN: CPT

## 2024-07-11 PROCEDURE — 63600175 PHARM REV CODE 636 W HCPCS: Mod: JZ,JG

## 2024-07-11 RX ADMIN — RABIES IMMUNE GLOBULIN (HUMAN) 1020 UNITS: 300 INJECTION, SOLUTION INFILTRATION; INTRAMUSCULAR at 04:07

## 2024-07-11 RX ADMIN — RABIES VIRUS STRAIN PM-1503-3M ANTIGEN (PROPIOLACTONE INACTIVATED) AND WATER 2.5 UNITS: KIT at 01:07

## 2024-07-11 NOTE — ED TRIAGE NOTES
Dione Egan, a 53 y.o. female presents to the ED w/ complaint of cat bite on Tuesday. Pt was told to come to ED for rabies vaccination.     Triage note:  Chief Complaint   Patient presents with    Animal Bite     Was told to come get a rabies shot bc it was a stray cat     Review of patient's allergies indicates:   Allergen Reactions    Vicodin [hydrocodone-acetaminophen] Shortness Of Breath     Past Medical History:   Diagnosis Date    COURTNEY II (cervical intraepithelial neoplasia II) 2016    KJB---LEEP    CIS (carcinoma in situ of cervix) 2004    Dermoid cyst of ovary 2020    LEFT <1 CM

## 2024-07-11 NOTE — ED PROVIDER NOTES
Encounter Date: 7/11/2024       History     Chief Complaint   Patient presents with    Animal Bite     Was told to come get a rabies shot bc it was a stray cat     53-year-old female with past medical history as below presents for rabies prophylaxis after stray cat bite.  Two days ago, patient encountered a stray cat on a street that was floating.  Patient attempted to rescue cat, at which point the cat scratched her on both hands and bit her left hand.  Patient then let go of the cat, which ran away; the cat is not in custody.  Patient reports some swelling and pain affecting her left hand.  She denies fevers/chills, redness, warmth in the affected extremity, loss of sensation, any other symptoms.  Patient had a tele doc visit were antibiotics were prescribed; tele doc also advised patient to come to emergency department for rabies prophylaxis.    The history is provided by the patient.     Review of patient's allergies indicates:   Allergen Reactions    Vicodin [hydrocodone-acetaminophen] Shortness Of Breath     Past Medical History:   Diagnosis Date    COURTNEY II (cervical intraepithelial neoplasia II) 2016    KJB---LEEP    CIS (carcinoma in situ of cervix) 2004    Dermoid cyst of ovary 2020    LEFT <1 CM     Past Surgical History:   Procedure Laterality Date    CERVICAL BIOPSY  W/ LOOP ELECTRODE EXCISION  2016    KJB---COURTNEY II    CERVICAL CONIZATION   W/ LASER  2006    [facility]    INTRAUTERINE DEVICE INSERTION  2015/2020    MIRENA    REMOVAL OF INTRAUTERINE DEVICE (IUD)  2020    OFFICE HYSTROSCOPY     Family History   Problem Relation Name Age of Onset    Breast cancer Paternal Aunt      Cancer Father Aditya Egan         Prostate    Hyperlipidemia Mother      Colon cancer Neg Hx      Ovarian cancer Neg Hx       Social History     Tobacco Use    Smoking status: Former     Current packs/day: 0.00     Average packs/day: 0.3 packs/day for 5.0 years (1.3 ttl pk-yrs)     Types: Cigarettes     Start date: 1/1/1988     Quit  "date: 1992     Years since quittin.5    Smokeless tobacco: Never   Substance Use Topics    Alcohol use: No    Drug use: No         Physical Exam     Initial Vitals [24 1044]   BP Pulse Resp Temp SpO2   122/85 71 16 97.9 °F (36.6 °C) 99 %      MAP       --         Physical Exam    Nursing note and vitals reviewed.  Constitutional: She appears well-developed and well-nourished.   Musculoskeletal:        Hands:       Comments: Equal strength and sensation in hands bilaterally  Cap refill > 2 seconds in all digits  Able to make "OK" sign, cross fingers  Several linear abrasions over dorsum of hands bilaterally without lacerations or puncture wounds  Swelling over dorsum of left hand without erythema, streaking, warmth, or fluctuance in an approximately 2-cm diameter between the thumb and middle fingers     Neurological: She is alert. GCS score is 15. GCS eye subscore is 4. GCS verbal subscore is 5. GCS motor subscore is 6.         ED Course   Procedures  Labs Reviewed - No data to display       Imaging Results    None          Medications   rabies vaccine,human diploid injection 2.5 Units (2.5 Units Intramuscular Given 24 1301)   rabies immune globulin (PF) (HYPERRAB) injection 1,020 Units (1,020 Units Other Given by Provider 24 1642)     Medical Decision Making  53-year-old female with past medical history as below presents for rabies prophylaxis after stray cat bite.    Patient was evaluated via tele doc prior to arrival in emergency department and already has prescription for Augmentin as well as antibiotic ointment.  On exam, no concerning signs for acutely worsening hand infection or flexor tenosynovitis--no streaking, no erythema, no fluctuance.  No history of fevers.    Care of patient is assumed by oncoming team at time of sign-out.  Patient is pending immunoglobulin injection.  Plan is for discharge.    Risk  Prescription drug management.  Risk Details: Patient received rabies vaccine " and rabies immunoglobulin while in the emergency department.              Attending Attestation:   Physician Attestation Statement for Resident:  As the supervising MD   Physician Attestation Statement: I have personally seen and examined this patient.   I agree with the above history.  -:   As the supervising MD I agree with the above PE.     As the supervising MD I agree with the above treatment, course, plan, and disposition.   -: Pt received Tdap yesterday.  Declined XR of the hand.  No signs of acute infection, has prophylactic abx prescribed.  Will give first rabies vaccine and rabies immunoglobulin.                   ED Course as of 07/11/24 1647   Thu Jul 11, 2024   1551 Discussed hand x-ray to evaluate for retained foreign body; pt verbalized understanding of rationale, but declined x-ray at this time [BH]      ED Course User Index  [BH] Jorden Jasmine MD                           Clinical Impression:  Final diagnoses:  [W55.01XA] Cat bite  [Z20.3] Need for post exposure prophylaxis for rabies (Primary)  [Z29.14] Encounter for prophylactic administration of rabies immune globulin                 Jorden Jasmine MD  Resident  07/11/24 1647       Peter Arriaza MD  07/12/24 2038

## 2024-07-11 NOTE — DISCHARGE INSTRUCTIONS
Diagnosis:   1. Need for post exposure prophylaxis for rabies    2. Cat bite    3. Encounter for prophylactic administration of rabies immune globulin        Tests you had showed:   Labs Reviewed - No data to display   No orders to display       Treatments you received were:   Medications   rabies vaccine,human diploid injection 2.5 Units (2.5 Units Intramuscular Given 7/11/24 1301)   rabies immune globulin (PF) (HYPERRAB) injection 1,020 Units (1,020 Units Other Given by Provider 7/11/24 1642)       Home Care Instructions:  - Medications: Continue taking your home medications as prescribed    Follow-Up Plan:  - Follow-up with: Primary care doctor within 3-5 days  - Additional testing and/or evaluation will be directed by your primary doctor  - Return to the Emergency Department or an Urgent Care for your day 3, 7, and 14 rabies injections    Return to the Emergency Department for symptoms including but not limited to: red streaking up your hand or arm, worsening symptoms, severe back pain, shortness of breath or chest pain, vomiting with inability to hold down fluids, blood in vomit or poop, fevers greater than 100.4°F, passing out/fainting/unconsciousness, or other concerning symptoms.     If you do not have a primary care doctor, you may contact the one listed on your discharge paperwork or you may also call the [facility] Appointment Desk at [phone] to schedule an appointment and establish care with one. It is important to your health that you have a primary care doctor.    Please take all medications as directed. All medications may potentially have side-effects and it is impossible to predict which medications may give you side-effects or what side-effects (if any) they will give you. If you feel that you are having a negative effect or side-effect of any medication you should immediately stop taking them and seek medical attention. If you feel that you are having a life-threatening reaction call  [phone].

## 2024-08-20 ENCOUNTER — PATIENT MESSAGE (OUTPATIENT)
Dept: OPTOMETRY | Facility: CLINIC | Age: 54
End: 2024-08-20
Payer: COMMERCIAL

## 2024-09-16 ENCOUNTER — OFFICE VISIT (OUTPATIENT)
Dept: OPTOMETRY | Facility: CLINIC | Age: 54
End: 2024-09-16
Payer: COMMERCIAL

## 2024-09-16 DIAGNOSIS — H52.03 HYPEROPIA OF BOTH EYES WITH ASTIGMATISM AND PRESBYOPIA: Primary | ICD-10-CM

## 2024-09-16 DIAGNOSIS — H52.4 HYPEROPIA OF BOTH EYES WITH ASTIGMATISM AND PRESBYOPIA: Primary | ICD-10-CM

## 2024-09-16 DIAGNOSIS — H52.203 HYPEROPIA OF BOTH EYES WITH ASTIGMATISM AND PRESBYOPIA: Primary | ICD-10-CM

## 2024-09-16 PROCEDURE — 92015 DETERMINE REFRACTIVE STATE: CPT | Mod: S$GLB,,, | Performed by: OPTOMETRIST

## 2024-09-16 PROCEDURE — 99999 PR PBB SHADOW E&M-EST. PATIENT-LVL II: CPT | Mod: PBBFAC,,, | Performed by: OPTOMETRIST

## 2024-09-16 PROCEDURE — 92014 COMPRE OPH EXAM EST PT 1/>: CPT | Mod: S$GLB,,, | Performed by: OPTOMETRIST

## 2024-09-16 NOTE — PROGRESS NOTES
HPI    Annual Exam   Pt states vision poor @ Near c contacts   OTC unsure of power     Pt denies F/F     Pt denies Dry/ Itchy/ Burning  Gtt: No    Annual CL EXAM   Brand: B & L Ultra Presby   Replacement: 1 month  Sleeps in lenses: No  Comfort problems: Yes, describe: Poor vision @ near   Solution: Optifree  Comments: Pt reports prioritize Near & Computer         Last edited by Meagan Rendon on 9/16/2024  1:37 PM.            Assessment /Plan     For exam results, see Encounter Report.    Hyperopia of both eyes with astigmatism and presbyopia  -Eyemed  -Dispensed trials #2,3 1 wk PHREV  Eyeglass Final Rx       Eyeglass Final Rx         Sphere Cylinder Dist VA Add    Right +2.25 Sphere 20/20 +1.75    Left +2.25 Sphere 20/20 +1.75      Type: PAL    Expiration Date: 9/16/2025                      RTC 1 yr

## 2024-09-19 ENCOUNTER — PATIENT OUTREACH (OUTPATIENT)
Dept: ADMINISTRATIVE | Facility: HOSPITAL | Age: 54
End: 2024-09-19
Payer: COMMERCIAL

## 2024-09-19 DIAGNOSIS — Z12.31 ENCOUNTER FOR SCREENING MAMMOGRAM FOR BREAST CANCER: Primary | ICD-10-CM

## 2024-09-19 NOTE — PROGRESS NOTES
Health Maintenance Due   Topic Date Due    Shingles Vaccine (1 of 2) Never done    Influenza Vaccine (1) 09/01/2024    COVID-19 Vaccine (3 - 2023-24 season) 09/01/2024    Mammogram  09/05/2024    Colorectal Cancer Screening  10/12/2024     Triggered LINKS and reconciled immunizations. Updated Care Everywhere. Pt responded to campaigns outreach asking to schedule mammogram- order placed and pt contacted to schedule. Chart review completed.

## 2024-09-20 ENCOUNTER — PATIENT OUTREACH (OUTPATIENT)
Dept: ADMINISTRATIVE | Facility: HOSPITAL | Age: 54
End: 2024-09-20
Payer: COMMERCIAL

## 2024-09-20 DIAGNOSIS — Z12.11 ENCOUNTER FOR COLORECTAL CANCER SCREENING: Primary | ICD-10-CM

## 2024-09-20 DIAGNOSIS — Z12.12 ENCOUNTER FOR COLORECTAL CANCER SCREENING: Primary | ICD-10-CM

## 2024-09-20 NOTE — PROGRESS NOTES
Health Maintenance Due   Topic Date Due    Shingles Vaccine (1 of 2) Never done    Influenza Vaccine (1) 09/01/2024    COVID-19 Vaccine (3 - 2023-24 season) 09/01/2024    Mammogram  09/05/2024    Colorectal Cancer Screening  10/12/2024     Triggered LINKS and reconciled immunizations. Updated Care Everywhere. Pt responded to outreach stating her colorectal cancer screening preference is a colonoscopy. Referral to endo  placed and pt notified they will be contacted to schedule colonoscopy. Chart review completed.

## 2024-09-23 ENCOUNTER — PATIENT MESSAGE (OUTPATIENT)
Dept: OPTOMETRY | Facility: CLINIC | Age: 54
End: 2024-09-23
Payer: COMMERCIAL

## 2024-10-02 ENCOUNTER — TELEPHONE (OUTPATIENT)
Dept: OPTOMETRY | Facility: CLINIC | Age: 54
End: 2024-10-02
Payer: COMMERCIAL

## 2024-10-02 NOTE — TELEPHONE ENCOUNTER
Final  Contact Lens Final Rx       Final Contact Lens Rx         Brand Base Curve Diameter Sphere Cylinder Add    Right [employer]ch [employer] Ultra for Presbyopia 8.50 14.2 +2.50 Sphere High    Left [employer]ch [employer] Ultra for Presbyopia 8.50 14.2 +2.25 Sphere High      Expiration Date: 10/2/2025    Replacement: Monthly    Wearing Schedule: Daily Wear

## 2024-10-12 ENCOUNTER — PATIENT MESSAGE (OUTPATIENT)
Dept: OPTOMETRY | Facility: CLINIC | Age: 54
End: 2024-10-12
Payer: COMMERCIAL

## 2024-11-06 ENCOUNTER — PATIENT MESSAGE (OUTPATIENT)
Dept: OPTOMETRY | Facility: CLINIC | Age: 54
End: 2024-11-06
Payer: COMMERCIAL

## 2024-11-18 PROBLEM — E55.9 VITAMIN D DEFICIENCY: Status: RESOLVED | Noted: 2018-12-17 | Resolved: 2024-11-18

## 2024-11-18 NOTE — PROGRESS NOTES
History of Present Illness    CHIEF COMPLAINT:  Dione presents today for routine annual follow-up and to discuss several health concerns.    GASTROINTESTINAL:  She reports experiencing constipation for approximately one year, which is new for her. She describes it as not having regular daily bowel movements, feeling bloated, and experiencing back pain. She has been taking stool softeners daily (two at night and one in the morning), which have helped alleviate some symptoms.  She also mentions a decreased intake of vegetables and fruits, which she previously enjoyed eating. She denies any desire for weight loss associated with these dietary changes.    PERIMENOPAUSE:  At 54 years old, she expresses concerns about perimenopause and questions whether symptoms might be related to perimenopause or changes in metabolism. She possibly still has menstrual cycles but is uncertain due to having an IUD. She has researched the topic but was unable to find strong evidence linking symptoms to perimenopause.    EAR ISSUES:  She reports feeling of water in her right ear, experiencing a bell effect with certain voice tones when people speak, and notes a clicking sound that varies depending on how people speak. She denies discomfort associated with these symptoms and describes the issue as minor.    HEADACHES:  She reports experiencing a constant mild headache, describing the severity as 3-4 on a scale of 1 to 10, localized to the frontal region. She occasionally experiences morning nausea associated with the headache. She denies any significant allergies, runny nose, or sneezing, though mentions a slight reaction to mold.    SLEEP:  She reports difficulty sleeping, stating she wakes up frequently throughout the night and experiences numerous dreams. Despite these issues, she denies feeling excessively tired upon waking. She denies snoring or symptoms suggestive of sleep apnea. No current use of sleep aids  mentioned.    CHOLESTEROL:  She expresses concern about her cholesterol levels, recalling that her total cholesterol was slightly elevated in previous tests, while other components were normal. She demonstrates awareness of the need for follow-up testing, including a lipid panel and lipoprotein check.    EXERCISE:  She reports engaging in moderate to strenuous exercise 7 days per week for approximately 30 minutes each session.    PAST MEDICAL HISTORY:  She reports a history of broken bones in both arms during her youth. She also mentions a past incident of food poisoning approximately 10 years ago, which resulted in projectile vomiting. This event led to the dislodgement of a crystal in her middle ear, causing vertigo that persisted for one year.      ROS:  General: no fever, no chills, no fatigue, no weight gain, no weight loss  Eyes: no vision changes, no redness, no discharge  ENT: no ear pain, no nasal congestion, no sore throat, reports tinnitus, no runny nose  Cardiovascular: no chest pain, no palpitations, no lower extremity edema  Respiratory: no cough, no shortness of breath  Gastrointestinal: no abdominal pain, no nausea, no vomiting, no diarrhea, reports constipation, no blood in stool  Genitourinary: no dysuria, no hematuria, no frequency  Musculoskeletal: no joint pain, no muscle pain  Skin: no rash, no lesion  Neurological: reports headache, no dizziness, no numbness, no tingling  Psychiatric: no anxiety, no depression, reports sleep difficulty  Allergic: no frequent sneezing       Lab Results   Component Value Date    TSH 2.286 08/17/2023         Atherosclerotic Cardiovascular Disease (ASCVD) Risk Score  The 10-year ASCVD risk score (Destiny CASTILLO, et al., 2019) is: 1.4%    Values used to calculate the score:      Age: 54 years      Sex: Female      Is Non- : No      Diabetic: No      Tobacco smoker: No      Systolic Blood Pressure: 108 mmHg      Is BP treated: No      HDL  Cholesterol: 54 mg/dL      Total Cholesterol: 217 mg/dL    Past Medical History:  Past Medical History:   Diagnosis Date    COURTNEY II (cervical intraepithelial neoplasia II)     KJB---LEEP    CIS (carcinoma in situ of cervix)     Dermoid cyst of ovary     LEFT <1 CM       Past Surgical History:  Past Surgical History:   Procedure Laterality Date    CERVICAL BIOPSY  W/ LOOP ELECTRODE EXCISION      KJB---COURTNEY II    CERVICAL CONIZATION   W/ LASER      [facility]    INTRAUTERINE DEVICE INSERTION      MIRENA    REMOVAL OF INTRAUTERINE DEVICE (IUD)      OFFICE HYSTROSCOPY       Allergies:  Review of patient's allergies indicates:   Allergen Reactions    Vicodin [hydrocodone-acetaminophen] Shortness Of Breath       Family History:  Family History   Problem Relation Name Age of Onset    Breast cancer Paternal Aunt      Cancer Father Adityaaddy Egan         Prostate    Hyperlipidemia Mother      Colon cancer Neg Hx      Ovarian cancer Neg Hx         Social History:  Social History     Tobacco Use    Smoking status: Former     Current packs/day: 0.00     Average packs/day: 0.3 packs/day for 5.0 years (1.3 ttl pk-yrs)     Types: Cigarettes     Start date: 1988     Quit date: 1992     Years since quittin.9    Smokeless tobacco: Never   Substance Use Topics    Alcohol use: No    Drug use: No     Social History     Socioeconomic History    Marital status:    Tobacco Use    Smoking status: Former     Current packs/day: 0.00     Average packs/day: 0.3 packs/day for 5.0 years (1.3 ttl pk-yrs)     Types: Cigarettes     Start date: 1988     Quit date: 1992     Years since quittin.9    Smokeless tobacco: Never   Substance and Sexual Activity    Alcohol use: No    Drug use: No    Sexual activity: Yes     Partners: Male     Birth control/protection: I.U.D.     Social Drivers of Health     Financial Resource Strain: Low Risk  (2024)    Overall Financial Resource Strain (CARDIA)  "    Difficulty of Paying Living Expenses: Not hard at all   Food Insecurity: No Food Insecurity (11/22/2024)    Hunger Vital Sign     Worried About Running Out of Food in the Last Year: Never true     Ran Out of Food in the Last Year: Never true   Transportation Needs: No Transportation Needs (11/22/2024)    TRANSPORTATION NEEDS     Transportation : No   Physical Activity: Sufficiently Active (11/22/2024)    Exercise Vital Sign     Days of Exercise per Week: 7 days     Minutes of Exercise per Session: 30 min   Stress: Stress Concern Present (11/22/2024)    [employer] - Occupational Stress Questionnaire     Feeling of Stress : To some extent   Housing Stability: Low Risk  (11/22/2024)    Housing Stability Vital Sign     Unable to Pay for Housing in the Last Year: No     Homeless in the Last Year: No       Mental Health Screening  PHQ-9  Depression Patient Health Questionnaire (PHQ-9)  Over the last two weeks how often have you been bothered by little interest or pleasure in doing things: Not at all  Over the last two weeks how often have you been bothered by feeling down, depressed or hopeless: Not at all    LEROY-7  Generalized Anxiety Disorder 7-item (LEROY-7) Scale. HOW OFTEN DURING THE PAST 2 WEEKS HAVE YOU FELT BOTHERED BY:  1. Feeling nervous, anxious, or on edge?: Not at all  2. Not being able to stop or control worrying?: Not at all  Number answered (out of first 7): 2    PHYSICAL EXAM   Vital Signs  /64   Ht 5' 5" (1.651 m)   Wt 49.2 kg (108 lb 9.2 oz)   BMI 18.07 kg/m²     Physical Exam    Vitals: Blood pressure: 108/UNKNOWN.  General: Well-developed. Well-nourished. No acute distress.  Eyes: EOMI. Sclerae anicteric. PERRLA. Conjunctiva pink.  HENT: Normocephalic. Atraumatic. Nares patent. Moist oral mucosa.  Ears: Bilateral TMs clear.  Cardiovascular: Regular rate. Regular rhythm. No murmurs. No rubs. No gallops. Normal S1, S2.  Respiratory: Normal respiratory effort. " Clear to auscultation bilaterally. No rales. No rhonchi. No wheezing.  Abdomen: Non-tender. Normoactive bowel sounds.  Musculoskeletal: No  obvious deformity.  Extremities: No lower extremity edema.  Neurological: Alert & oriented x3. No slurred speech. Normal gait.  Psychiatric: Normal mood. Normal affect. Good insight. Good judgment.  Skin: Warm. Dry. No rash.  Neck: Thyroid not enlarged, no palpable nodules. No lymphadenopathy.           Lab Results   Component Value Date    WBC 3.80 (L) 08/17/2023    HGB 12.6 08/17/2023    HCT 38.3 08/17/2023     08/17/2023    CHOL 217 (H) 08/17/2023    TRIG 46 08/17/2023    HDL 54 08/17/2023    ALT 12 08/17/2023    AST 18 08/17/2023     08/17/2023    K 4.0 08/17/2023     08/17/2023    CREATININE 0.8 08/17/2023    BUN 20 08/17/2023    CO2 26 08/17/2023    TSH 2.286 08/17/2023    HGBA1C 5.1 08/17/2023     ASSESSMENT/PLAN     Dione Egan is a 54 y.o. female with    1. Familial hypercholesteremia  Assessment & Plan:  Lab Results   Component Value Date    LDLCALC 153.8 08/17/2023   **check lipoprotein (a)  **recheck lipid panel    Orders:  -     TSH; Future; Expected date: 11/22/2024  -     LIPID PANEL; Future; Expected date: 11/22/2024  -     LIPOPROTEIN A (LPA); Future; Expected date: 11/22/2024    2. Screening for colon cancer  Assessment & Plan:  She has an upcoming appointment with GI for the pre-visit planning before her first screening colonoscopy  --she did Cologuard about 4 years ago and it was negative  --has had a recent change in bowel habits -- instead of one BM per day, one every other day, but with some associated bloating and discomfort  --however, stool softeners have adequately alleviated those symptoms  **will check TSH also      3. Chronic nonintractable headache, unspecified headache type  Assessment & Plan:  3-4/10 in intensity, constantly present but alleviated by ibuprofen   --Frontotemporal predominantly  --no associated symptoms  --we  discussed prophylactic therapy with nortriptyline, but she reports they are not bothersome enough to treat with medication at this time >> she elects to treat as needed with ibuprofen           Follow up in about 1 year (around 11/22/2025).    Delgado Woodard MD/[facility]  [facility] Medicine  [facility]

## 2024-11-18 NOTE — ASSESSMENT & PLAN NOTE
Lab Results   Component Value Date    LDLCALC 153.8 08/17/2023   **check lipoprotein (a)  **recheck lipid panel

## 2024-11-22 ENCOUNTER — OFFICE VISIT (OUTPATIENT)
Dept: PRIMARY CARE CLINIC | Facility: CLINIC | Age: 54
End: 2024-11-22
Attending: INTERNAL MEDICINE
Payer: COMMERCIAL

## 2024-11-22 ENCOUNTER — LAB VISIT (OUTPATIENT)
Dept: LAB | Facility: HOSPITAL | Age: 54
End: 2024-11-22
Attending: INTERNAL MEDICINE
Payer: COMMERCIAL

## 2024-11-22 VITALS
HEIGHT: 65 IN | WEIGHT: 108.56 LBS | DIASTOLIC BLOOD PRESSURE: 64 MMHG | SYSTOLIC BLOOD PRESSURE: 108 MMHG | BODY MASS INDEX: 18.09 KG/M2

## 2024-11-22 DIAGNOSIS — R51.9 CHRONIC NONINTRACTABLE HEADACHE, UNSPECIFIED HEADACHE TYPE: ICD-10-CM

## 2024-11-22 DIAGNOSIS — Z12.11 SCREENING FOR COLON CANCER: ICD-10-CM

## 2024-11-22 DIAGNOSIS — G89.29 CHRONIC NONINTRACTABLE HEADACHE, UNSPECIFIED HEADACHE TYPE: ICD-10-CM

## 2024-11-22 DIAGNOSIS — E78.01 FAMILIAL HYPERCHOLESTEREMIA: Primary | ICD-10-CM

## 2024-11-22 DIAGNOSIS — E78.01 FAMILIAL HYPERCHOLESTEREMIA: ICD-10-CM

## 2024-11-22 LAB
CHOLEST SERPL-MCNC: 215 MG/DL (ref 120–199)
CHOLEST/HDLC SERPL: 3.5 {RATIO} (ref 2–5)
HDLC SERPL-MCNC: 62 MG/DL (ref 40–75)
HDLC SERPL: 28.8 % (ref 20–50)
LDLC SERPL CALC-MCNC: 141.2 MG/DL (ref 63–159)
NONHDLC SERPL-MCNC: 153 MG/DL
TRIGL SERPL-MCNC: 59 MG/DL (ref 30–150)
TSH SERPL DL<=0.005 MIU/L-ACNC: 1.68 UIU/ML (ref 0.4–4)

## 2024-11-22 PROCEDURE — 84443 ASSAY THYROID STIM HORMONE: CPT | Performed by: INTERNAL MEDICINE

## 2024-11-22 PROCEDURE — 36415 COLL VENOUS BLD VENIPUNCTURE: CPT | Performed by: INTERNAL MEDICINE

## 2024-11-22 PROCEDURE — 83695 ASSAY OF LIPOPROTEIN(A): CPT | Performed by: INTERNAL MEDICINE

## 2024-11-22 PROCEDURE — 80061 LIPID PANEL: CPT | Performed by: INTERNAL MEDICINE

## 2024-11-22 NOTE — PATIENT INSTRUCTIONS
Constipation   First of all, it is important to understand that normal frequency of bowel movements varies tremendously across the population.  It can range anywhere from 3 times per day to as little once a week for some people.  And we don't even consider it as constipation unless it is 2 or fewer times per week.  But when you begin to experience difficulties with bowel movements such as straining or experiencing a feeling of incomplete emptying, bloating, or distension and when there is an abrupt change from your normal frequency, then we need to consider further investigation and/or treatment.   It is also important to understand that constipation is very common, affecting about 20% of the population.   Common causes of constipation   If we determine that you are suffering from constipation, the first thing we need to do is determine if it is acute--meaning new or of sudden onset--or chronic, generally regarding as lasting for more than a few months.  Then we may need to evaluate for potential secondary causes of constipation.  Common causes of secondary constipation include medicines and supplements (please scan the list below and see if you are taking any of them):  Antidiarrheals like loperamide (Imodium-AD)  Anticholinergics  Antispasmodics like   Hyoscine (Buscopan)  Hyoscyamine (Levsin)  Dicycloamine (Bentyl)  Alverine (Spasmonal, Audmonal)  Peppermint oil   Parkinson disease medications (levodopa, amantadine)  Tricyclic antidepressants (amitriptyline, nortriptyline)  Antihistamines   Loratadine (Claritin), cetirizine (Zyrtec), fexofenadine (Allegra)  Diphenhydramine (Benadryl); chlorpheniramine, brompheniramine, doxylamine (found in many OTC cold remedies)  NSAIDs (ibuprofen [Advil or Motrin], naproxen [Aleve]; meloxicam [Mobic], celecoxib [Celebrex], Voltaren [Diclofenac])  Iron supplements (both over the counter and prescription forms)  Calcium supplements (like TUMS)  Bismuth (found in Pepto  Bismol)  Some antihypertensives (verapamil, diltiazem, diuretics, clonidine)  Ondansetron (Zofran)  Mechanical causes of constipation can include things like strictures (from inflammation), or adhesions (from prior surgeries), or rarely scary things like colorectal cancer.  If you are over the age of 45 or if you have a strong family history of colon cancer or a familial polyposis syndrome, then you will need a colonoscopy as part of the evaluation.  Pelvic floor issues can also play a role in some cases, especially in women who experienced a history of vaginal tear from childbirth or delivered multiple children vaginally.    Depending on other symptoms we may need to check a TSH to evaluate for possible hypothyroidism or other metabolic issues.  Sometimes we may need to check a calcium level or a potassium level.  And of course, pregnancy is a very common cause of constipation, but more typically in the later stages due to an enlarged uterus.    Lastly, it is important to know that psychosocial issues, such as depression, are a very common cause of constipation, as is prolonged immobility.    Evaluation of constipation  If you are taking a medication or supplement that can be associated with constipation, the first thing we need to do is see if there is a relationship between the medication and the onset of constipation.  Did the constipation begin after the medication was started?  Have you tried stopping the medication or supplement to see if the constipation resolved?    Red flag or alarm features of constipation include the following:  Blood in the stool (often due to internal or external hemorrhoids, but could prompt the need for further evaluation with colonoscopy)  Sudden onset constipation in an older adult (> 65 years of age)  Unintentional weight loss  Family history of colorectal cancer  Unexplained anemia  Age > 45 with no prior history of colonoscopy  Rarely, we may need to perform imaging such as an  x-ray or CT scan depending on the clinical circumstances.  Sometimes we may need to send you to a GI specialist for further evaluation, especially if more sophisticated testing or colonoscopy is indicated.  Management of constipation  But first, before we send you for any invasive or uncomfortable tests, if we believe that a secondary cause of constipation (such as one of the causes listed above) is unlikely, then we will treat you presumptively for what we call functional constipation (the most common cause).  We will try several strategies to manage the symptoms and give you relief.    First, try and increase the amount of fiber in your diet by eating more fresh fruits, vegetables, and whole grains (such as whole wheat flour, brown rice, or oatmeal).  Next, drink more water.  You should be aiming for at least 8 to 10 glasses of water per day (8 oz) and adding 2 to 4 more may ease constipation. That's a total of a little more than 100 oz per day or about 3 liters.    Other options include various types of laxatives and stool softeners, and sometimes a combination of several different types from below at once.    Bulk laxatives or fiber supplements which consist of soluble and insoluble forms of fiber.  Soluble fiber is generally more effective and better tolerated.  Having said, it is important to know that fiber is not well absorbed from the GI tract into your circulation and therefore have no significant potential for causing harm to any organ systems.  Soluble fiber supplements draw water into the GI tract and essentially help to add bulk to the stool and speed its passage through the GI tract. You may need to experiment with each one to find out what works best for you.   The most common is psyllium (Metamucil) available in a variety of forms, and you may need to experiment with several forms to see what works best for you.  Methylcellulose (Citrucel)  Calcium polycarbophil (Fibercon)  Wheat dextrin  (Benefiber)  Insoluble fiber (bran, rye, flaxseed) - not as effective as the soluble fiber above and can cause bloating, distention, flatulence, and cramping  Osmotic laxatives - poorly absorbed compounds that bring water into the lumen of the GI tract  Polyethylene glycol (Miralax)  Lactulose   Magnesium hydroxide or magnesium citrate  Sorbitol  Stimulant laxatives - stimulate contractions in the GI tract to propel stool forward  Senna (Senokot, ExLax)  Bisacodyl (Dulcolax) - suppositories work faster than oral formulations  Stool softeners - very well tolerated but probably the least effective of all these potential solutions  Docusate (Colace)  Rarely, we may need to try a prescription medication (such as lubiprostone or linaclotide), but you will have gone through extensive evaluation and trials of all the interventions mentioned above before we ever get to that point.            Primary headaches consist of migraine, tension-type, and cluster.  Cluster headaches are very rare, occurring in only 1/100,000 people.  Migraine headaches are commonly misdiagnosed and therefore are often not treated appropriately.  Our goal will be to accurately diagnose the cause of your headache and then work together to find the treatment that will give you relief.  Tension Type:  Abortive   Acetaminophen (Tylenol)  Aspirin   Caffeine-containing compounds (e.g., Cafergot)  NSAIDs  Preventive  TCAs (amitriptyline [Elavil], nortriptyline [Pamelor])  SNRIs (duloxetine [Cymbalta], venlafaxine [Effexor]); also mirtazapine (Remeron)  Some experts have used the muscle relaxant tizanidine (Zanaflex) in the past, but current evidence does not support the use of muscle relaxants, benzodiazepines, opioids, or Botox in the management of tension-type headaches  NSAIDs - not really a good choice because of risk of stomach irritation and probably should be reserved for abortive therapy; also, not a good choice in older patients with high blood  pressure, chronic kidney disease, or other medical problems

## 2024-11-22 NOTE — ASSESSMENT & PLAN NOTE
She has an upcoming appointment with GI for the pre-visit planning before her first screening colonoscopy  --she did Cologuard about 4 years ago and it was negative  --has had a recent change in bowel habits -- instead of one BM per day, one every other day, but with some associated bloating and discomfort  --however, stool softeners have adequately alleviated those symptoms  **will check TSH also

## 2024-11-22 NOTE — ASSESSMENT & PLAN NOTE
3-4/10 in intensity, constantly present but alleviated by ibuprofen   --Frontotemporal predominantly  --no associated symptoms  --we discussed prophylactic therapy with nortriptyline, but she reports they are not bothersome enough to treat with medication at this time >> she elects to treat as needed with ibuprofen

## 2024-11-29 ENCOUNTER — PATIENT MESSAGE (OUTPATIENT)
Dept: PRIMARY CARE CLINIC | Facility: CLINIC | Age: 54
End: 2024-11-29
Payer: COMMERCIAL

## 2024-12-11 ENCOUNTER — CLINICAL SUPPORT (OUTPATIENT)
Dept: ENDOSCOPY | Facility: HOSPITAL | Age: 54
End: 2024-12-11
Attending: INTERNAL MEDICINE
Payer: COMMERCIAL

## 2024-12-11 ENCOUNTER — TELEPHONE (OUTPATIENT)
Dept: ENDOSCOPY | Facility: HOSPITAL | Age: 54
End: 2024-12-11

## 2024-12-11 VITALS — BODY MASS INDEX: 17.99 KG/M2 | HEIGHT: 65 IN | WEIGHT: 108 LBS

## 2024-12-11 DIAGNOSIS — Z12.11 ENCOUNTER FOR COLORECTAL CANCER SCREENING: ICD-10-CM

## 2024-12-11 DIAGNOSIS — Z12.12 ENCOUNTER FOR COLORECTAL CANCER SCREENING: ICD-10-CM

## 2024-12-11 RX ORDER — SODIUM, POTASSIUM,MAG SULFATES 17.5-3.13G
1 SOLUTION, RECONSTITUTED, ORAL ORAL DAILY
Qty: 1 KIT | Refills: 0 | Status: SHIPPED | OUTPATIENT
Start: 2024-12-11 | End: 2024-12-13

## 2024-12-11 NOTE — TELEPHONE ENCOUNTER
Referral for procedure from PAT appointment      Spoke to pt to schedule procedure(s) Colonoscopy       Physician to perform procedure(s) Dr. VAMSI Mesa  Date of Procedure (s) 1/29/25  Arrival Time 8:15 AM  Time of Procedure(s) 9:15 AM   Location of Procedure(s) [facility] [address] [facility]  Type of Rx Prep sent to patient: Suprep  Instructions provided to patient via [facility]    Patient was informed on the following information and verbalized understanding. Screening questionnaire reviewed with patient and complete. If procedure requires anesthesia, a responsible adult needs to be present to accompany the patient home, patient cannot drive after receiving anesthesia. Appointment details are tentative, especially check-in time. Patient will receive a prep-op call 7 days prior to confirm check-in time for procedure. If applicable the patient should contact their pharmacy to verify Rx for procedure prep is ready for pick-up. Patient was advised to call the scheduling department at [phone] if pharmacy states no Rx is available. Patient was advised to call the endoscopy scheduling department if any questions or concerns arise.      [facility]oscopy Scheduling Department

## 2025-01-07 ENCOUNTER — TELEPHONE (OUTPATIENT)
Dept: GASTROENTEROLOGY | Facility: CLINIC | Age: 55
End: 2025-01-07
Payer: COMMERCIAL

## 2025-01-07 NOTE — TELEPHONE ENCOUNTER
----- Message from Torie sent at 1/7/2025  1:06 PM CST -----  Regarding: Reschedule Date of Procedure  Contact: [phone]    Reschedule    Caller:The pt     Call back number: [phone]    Current Appt Date: 01/29/25    Type of Appt:Procedure       Provider:Dr. Mesa       Reason for Rescheduling:Pt wanting to change the date of procedure.       Additional Information: Thank you.

## 2025-01-08 ENCOUNTER — TELEPHONE (OUTPATIENT)
Dept: ENDOSCOPY | Facility: HOSPITAL | Age: 55
End: 2025-01-08
Payer: COMMERCIAL

## 2025-01-08 NOTE — TELEPHONE ENCOUNTER
Referral for procedure from PAT appointment originally-called to R/S      Spoke to patient to schedule procedure(s) Colonoscopy       Physician to perform procedure(s) Dr. VAMSI Mesa  Date of Procedure (s) 2/1/25  Arrival Time 7:45 AM  Time of Procedure(s) 8:45 AM   Location of Procedure(s) [facility] [address]  Type of Rx Prep sent to patient: Suprep  Instructions provided to patient via [facility]    Patient was informed on the following information and verbalized understanding. Screening questionnaire reviewed with patient and complete. If procedure requires anesthesia, a responsible adult needs to be present to accompany the patient home, patient cannot drive after receiving anesthesia. Appointment details are tentative, especially check-in time. Patient will receive a prep-op call 7 days prior to confirm check-in time for procedure. If applicable the patient should contact their pharmacy to verify Rx for procedure prep is ready for pick-up. Patient was advised to call the scheduling department at [phone] if pharmacy states no Rx is available. Patient was advised to call the endoscopy scheduling department if any questions or concerns arise.      [facility] Scheduling Department

## 2025-01-08 NOTE — TELEPHONE ENCOUNTER
Patient rescheduled to 2/1/25.          January 8, 2025     SD    1/8/25  7:58 AM  Dulce Cheatham routed this conversation to Me  January 7, 2025     TB    1/7/25  2:57 PM  Kyree Adam MA routed this conversation to [facility] Endoscopy Schedulers  Kyree Adam MA   TB    1/7/25  2:57 PM  Note  See patient message.        Kyree Adam MA   TB    1/7/25  2:56 PM  Note  ----- Message from Torie sent at 1/7/2025  1:06 PM CST -----  Regarding: Reschedule Date of Procedure  Contact: [phone]     Reschedule     Caller:The pt      Call back number:[phone]     Current Appt Date: 01/29/25     Type of Appt:Procedure         Provider:Dr. Mesa         Reason for Rescheduling:Pt wanting to change the date of procedure.         Additional Information: Thank you.

## 2025-01-24 ENCOUNTER — TELEPHONE (OUTPATIENT)
Dept: GASTROENTEROLOGY | Facility: CLINIC | Age: 55
End: 2025-01-24
Payer: COMMERCIAL

## 2025-01-24 NOTE — TELEPHONE ENCOUNTER
----- Message from Rosario sent at 1/24/2025 11:03 AM CST -----  Regarding: appt  Contact: [phone]  Pt is calling to speak to someone in the office to r/s her appt that she is currently scheduled for; 2/1 no available appts in Epic. Please call to advise. [phone].     Patient's DX:     Additional Info:

## 2025-02-28 ENCOUNTER — TELEPHONE (OUTPATIENT)
Dept: ENDOSCOPY | Facility: HOSPITAL | Age: 55
End: 2025-02-28
Payer: COMMERCIAL

## 2025-02-28 NOTE — TELEPHONE ENCOUNTER
Attempted contact to schedule colonoscopy.   No answer-left main line phone number to return call.

## 2025-03-12 ENCOUNTER — TELEPHONE (OUTPATIENT)
Dept: ENDOSCOPY | Facility: HOSPITAL | Age: 55
End: 2025-03-12
Payer: COMMERCIAL

## 2025-03-12 VITALS — HEIGHT: 64 IN | BODY MASS INDEX: 18.78 KG/M2 | WEIGHT: 110 LBS

## 2025-03-12 DIAGNOSIS — Z12.11 COLON CANCER SCREENING: Primary | ICD-10-CM

## 2025-03-12 RX ORDER — SODIUM, POTASSIUM,MAG SULFATES 17.5-3.13G
1 SOLUTION, RECONSTITUTED, ORAL ORAL DAILY
Qty: 354 ML | Refills: 0 | Status: SHIPPED | OUTPATIENT
Start: 2025-03-12 | End: 2025-03-15

## 2025-03-12 NOTE — TELEPHONE ENCOUNTER
----- Message from Dulce sent at 2/25/2025 10:29 AM CST -----  Regarding: FW: appt  Contact: [phone]    ----- Message -----  From: Yoli Rosenthal  Sent: 2/24/2025   3:24 PM CST  To: [facility] Endoscopy Schedulers  Subject: appt                                             ..Type:  Needs Medical AdviceWho Called: pt Symptoms (please be specific): stated she has left several messages in regards to scheduling with no response  . Pt still needs to schedule procedure Would the patient rather a call back or a response via [facility]? Call Best Call Back Number: [phone] Information: n/a

## 2025-03-12 NOTE — TELEPHONE ENCOUNTER
Referral for procedure from  Workqueue referral (see Appts tab)      Spoke to patient to reschedule procedure(s) Colonoscopy       Physician to perform procedure(s) Dr. SINAI Peralta  Date of Procedure (s) 4/5/25  Arrival Time 6:30 AM  Time of Procedure(s) 7:30 AM   Location of Procedure(s) [facility] Floor  Type of Rx Prep sent to patient: Suprep  Instructions provided to patient via [facility]    Patient was informed on the following information and verbalized understanding. Screening questionnaire reviewed with patient and complete. If procedure requires anesthesia, a responsible adult needs to be present to accompany the patient home, patient cannot drive after receiving anesthesia. Appointment details are tentative, especially check-in time. Patient will receive a prep-op call 7 days prior to confirm check-in time for procedure. If applicable the patient should contact their pharmacy to verify Rx for procedure prep is ready for pick-up. Patient was advised to call the scheduling department at [phone] if pharmacy states no Rx is available. Patient was advised to call the endoscopy scheduling department if any questions or concerns arise.      [facility] Scheduling Department

## 2025-03-28 ENCOUNTER — TELEPHONE (OUTPATIENT)
Dept: ENDOSCOPY | Facility: HOSPITAL | Age: 55
End: 2025-03-28
Payer: COMMERCIAL

## 2025-03-28 NOTE — TELEPHONE ENCOUNTER
Called patient to confirm endoscopy appointment on 4/5/2025. All questions answered. Patient has prep and instructions. Appointment confirmed.

## 2025-03-31 ENCOUNTER — ANESTHESIA EVENT (OUTPATIENT)
Dept: ENDOSCOPY | Facility: HOSPITAL | Age: 55
End: 2025-03-31
Payer: COMMERCIAL

## 2025-03-31 NOTE — ANESTHESIA PREPROCEDURE EVALUATION
03/31/2025  Dione Egan is a 54 y.o., female.  [facility]  Anesthesia Pre-Operative Evaluation       Patient Name: Dione Egan  YOB: 1970  MRN: [id number]  [facility]: [id number]      Code Status: No Order   Date of Procedure: 4/5/2025  Anesthesia: Choice Procedure: Procedure(s) (LRB):  COLONOSCOPY, SCREENING, LOW RISK PATIENT (N/A)  Pre-Operative Diagnosis: Encounter for colorectal cancer screening [Z12.11, Z12.12]  Proceduralist: Surgeons and Role:     * Gael Peralta MD - Primary        SUBJECTIVE:   Dione Egan is a 54 y.o. female who  has a past medical history of COURTNEY II (cervical intraepithelial neoplasia II) (2016), CIS (carcinoma in situ of cervix) (2004), and Dermoid cyst of ovary (2020)..     she is not on any long-term medications.     ALLERGIES:     Review of patient's allergies indicates:   Allergen Reactions    Vicodin [hydrocodone-acetaminophen] Shortness Of Breath     LDA:          Lines/Drains/Airways       None                  Anesthesia Evaluation      Airway   Dental      Pulmonary    Cardiovascular     Neuro/Psych    (+) headaches    GI/Hepatic/Renal      Endo/Other    Abdominal                     MEDICATIONS:     Antibiotics (From admission, onward)      None          VTE Risk Mitigation (From admission, onward)      None              Current Medications[1]       History:   There are no hospital problems to display for this patient.    Surgical History:    has a past surgical history that includes Cervical conization w/ laser (2006); Intrauterine device insertion (2015/2020); Cervical biopsy w/ loop electrode excision (2016); and Removal of intrauterine device (IUD) (2020).   Social History:    reports being sexually active and has had partner(s) who are male. She reports using the following method of birth control/protection: I.U.D..  reports that she  "quit smoking about 33 years ago. Her smoking use included cigarettes. She started smoking about 37 years ago. She has a 1.3 pack-year smoking history. She has never used smokeless tobacco. She reports that she does not drink alcohol and does not use drugs.     OBJECTIVE:     Vital Signs (Most Recent):    Vital Signs Range (Last 24H):          There is no height or weight on file to calculate BMI.   Wt Readings from Last 4 Encounters:   03/12/25 49.9 kg (110 lb)   12/11/24 49 kg (108 lb)   11/22/24 49.2 kg (108 lb 9.2 oz)   07/11/24 50.8 kg (112 lb)       Significant Labs:  Lab Results   Component Value Date    WBC 3.80 (L) 08/17/2023    HGB 12.6 08/17/2023    HCT 38.3 08/17/2023     08/17/2023     08/17/2023    K 4.0 08/17/2023     08/17/2023    CREATININE 0.8 08/17/2023    BUN 20 08/17/2023    CO2 26 08/17/2023    GLU 94 08/17/2023    CALCIUM 9.8 08/17/2023    PHOS 3.5 09/24/2021    ALKPHOS 55 08/17/2023    ALT 12 08/17/2023    AST 18 08/17/2023    ALBUMIN 4.2 08/17/2023    HGBA1C 5.1 08/17/2023     No LMP recorded.  No results found for this or any previous visit (from the past 72 hours).    EKG:   No results found for this or any previous visit.    TTE:  No results found for this or any previous visit.  No results found for: "EF"   No results found for this or any previous visit.  REJI:  No results found for this or any previous visit.  Stress Test:  No results found for this or any previous visit.     LHC:  No results found for this or any previous visit.     PFT:  No results found for: "FEV1", "FVC", "OUH0RLX", "TLC", "DLCO"     ASSESSMENT/PLAN:         Pre-op Assessment    I have reviewed the Patient Summary Reports.     I have reviewed the Nursing Notes. I have reviewed the NPO Status.   I have reviewed the Medications.     Review of Systems  Anesthesia Hx:  No problems with previous Anesthesia             Denies Family Hx of Anesthesia complications.    Denies Personal Hx of Anesthesia " complications.                    Social:  Former Smoker, No Alcohol Use       Hematology/Oncology:  Hematology Normal   Oncology Normal                                   EENT/Dental:  EENT/Dental Normal           Cardiovascular:  Cardiovascular Normal                                              Pulmonary:  Pulmonary Normal                       Renal/:  Renal/ Normal                 Hepatic/GI:  Hepatic/GI Normal                    Musculoskeletal:  Musculoskeletal Normal                Neurological:      Headaches                                 Endocrine:  Endocrine Normal            Dermatological:  Skin Normal    Psych:  Psychiatric Normal                       Anesthesia Plan  Type of Anesthesia, risks & benefits discussed:    Anesthesia Type: Gen Natural Airway  Intra-op Monitoring Plan: Standard ASA Monitors  Induction:  IV  Informed Consent: Informed consent signed with the Patient and all parties understand the risks and agree with anesthesia plan.  All questions answered. Patient consented to blood products? No  ASA Score: 1  Day of Surgery Review of History & Physical: H&P Update referred to the surgeon/provider.    Ready For Surgery From Anesthesia Perspective.     .           [1]   Current Facility-Administered Medications   Medication Dose Route Frequency Provider Last Rate Last Admin    DIPH,PERTUSS(ACEL),TET VAC(PF)(ADULT)(ADACEL)(TDaP)  0.5 mL Intramuscular vaccine x 1 dose         levonorgestreL 20 mcg/24 hours (5 yrs) 52 mg IUD 1 Intra Uterine Device  1 Intra Uterine Device Intrauterine  Daljit Kinney Jr., MD   1 Intra Uterine Device at 08/24/20 1300     No current outpatient medications on file.

## 2025-04-05 ENCOUNTER — HOSPITAL ENCOUNTER (OUTPATIENT)
Facility: HOSPITAL | Age: 55
Discharge: HOME | End: 2025-04-05
Attending: COLON & RECTAL SURGERY | Admitting: COLON & RECTAL SURGERY
Payer: COMMERCIAL

## 2025-04-05 ENCOUNTER — ANESTHESIA (OUTPATIENT)
Dept: ENDOSCOPY | Facility: HOSPITAL | Age: 55
End: 2025-04-05
Payer: COMMERCIAL

## 2025-04-05 VITALS
RESPIRATION RATE: 20 BRPM | DIASTOLIC BLOOD PRESSURE: 53 MMHG | HEART RATE: 70 BPM | HEIGHT: 64 IN | WEIGHT: 110 LBS | TEMPERATURE: 97 F | SYSTOLIC BLOOD PRESSURE: 90 MMHG | OXYGEN SATURATION: 100 % | BODY MASS INDEX: 18.78 KG/M2

## 2025-04-05 DIAGNOSIS — Z12.11 SCREENING FOR COLON CANCER: Primary | ICD-10-CM

## 2025-04-05 DIAGNOSIS — Z12.11 COLON CANCER SCREENING: ICD-10-CM

## 2025-04-05 PROCEDURE — 37000009 HC ANESTHESIA EA ADD 15 MINS: Performed by: COLON & RECTAL SURGERY

## 2025-04-05 PROCEDURE — 37000008 HC ANESTHESIA 1ST 15 MINUTES: Performed by: COLON & RECTAL SURGERY

## 2025-04-05 PROCEDURE — G0121 COLON CA SCRN NOT HI RSK IND: HCPCS | Performed by: COLON & RECTAL SURGERY

## 2025-04-05 PROCEDURE — D9220A PRA ANESTHESIA: Mod: ,,, | Performed by: NURSE ANESTHETIST, CERTIFIED REGISTERED

## 2025-04-05 PROCEDURE — 63600175 PHARM REV CODE 636 W HCPCS: Performed by: NURSE ANESTHETIST, CERTIFIED REGISTERED

## 2025-04-05 PROCEDURE — 25000003 PHARM REV CODE 250: Performed by: NURSE ANESTHETIST, CERTIFIED REGISTERED

## 2025-04-05 PROCEDURE — G0121 COLON CA SCRN NOT HI RSK IND: HCPCS | Mod: ,,, | Performed by: COLON & RECTAL SURGERY

## 2025-04-05 RX ORDER — LIDOCAINE HYDROCHLORIDE 20 MG/ML
INJECTION INTRAVENOUS
Status: DISCONTINUED | OUTPATIENT
Start: 2025-04-05 | End: 2025-04-05

## 2025-04-05 RX ORDER — SODIUM CHLORIDE 9 MG/ML
INJECTION, SOLUTION INTRAVENOUS CONTINUOUS
Status: DISCONTINUED | OUTPATIENT
Start: 2025-04-05 | End: 2025-04-05 | Stop reason: HOSPADM

## 2025-04-05 RX ORDER — PROPOFOL 10 MG/ML
VIAL (ML) INTRAVENOUS
Status: DISCONTINUED | OUTPATIENT
Start: 2025-04-05 | End: 2025-04-05

## 2025-04-05 RX ORDER — CHOLECALCIFEROL (VITAMIN D3) 25 MCG
1000 TABLET ORAL DAILY
COMMUNITY

## 2025-04-05 RX ORDER — DOCUSATE SODIUM 50 MG/5ML
50 LIQUID ORAL DAILY
COMMUNITY

## 2025-04-05 RX ORDER — PROPOFOL 10 MG/ML
VIAL (ML) INTRAVENOUS CONTINUOUS PRN
Status: DISCONTINUED | OUTPATIENT
Start: 2025-04-05 | End: 2025-04-05

## 2025-04-05 RX ADMIN — PROPOFOL 200 MCG/KG/MIN: 10 INJECTION, EMULSION INTRAVENOUS at 07:04

## 2025-04-05 RX ADMIN — GLYCOPYRROLATE 0.1 MG: 0.2 INJECTION, SOLUTION INTRAMUSCULAR; INTRAVENOUS at 07:04

## 2025-04-05 RX ADMIN — SODIUM CHLORIDE: 0.9 INJECTION, SOLUTION INTRAVENOUS at 06:04

## 2025-04-05 RX ADMIN — LIDOCAINE HYDROCHLORIDE 50 MG: 20 INJECTION INTRAVENOUS at 07:04

## 2025-04-05 RX ADMIN — PROPOFOL 100 MG: 10 INJECTION, EMULSION INTRAVENOUS at 07:04

## 2025-04-05 NOTE — PROVATION PATIENT INSTRUCTIONS
Discharge Summary/Instructions after an Endoscopic Procedure  Patient Name: Dione Egan  Patient MRN: [id number]  Patient YOB: 1970  Saturday, April 5, 2025  Chidi Valdez MD  Dear patient,  As a result of recent federal legislation (The Federal Cures Act), you may   receive lab or pathology results from your procedure in your [facility]   account before your physician is able to contact you. Your physician or   their representative will relay the results to you with their   recommendations at their soonest availability.  Thank you,  RESTRICTIONS:  During your procedure today, you received medications for sedation.  These   medications may affect your judgment, balance and coordination.  Therefore,   for 24 hours, you have the following restrictions:   - DO NOT drive a car, operate machinery, make legal/financial decisions,   sign important papers or drink alcohol.    ACTIVITY:  Today: no heavy lifting, straining or running due to procedural   sedation/anesthesia.  The following day: return to full activity including work.  DIET:  Eat and drink normally unless instructed otherwise.     TREATMENT FOR COMMON SIDE EFFECTS:  - Mild abdominal pain, nausea, belching, bloating or excessive gas:  rest,   eat lightly and use a heating pad.  - Sore Throat: treat with throat lozenges and/or gargle with warm salt   water.  - Because air was used during the procedure, expelling large amounts of air   from your rectum or belching is normal.  - If a bowel prep was taken, you may not have a bowel movement for 1-3 days.    This is normal.  SYMPTOMS TO WATCH FOR AND REPORT TO YOUR PHYSICIAN:  1. Abdominal pain or bloating, other than gas cramps.  2. Chest pain.  3. Back pain.  4. Signs of infection such as: chills or fever occurring within 24 hours   after the procedure.  5. Rectal bleeding, which would show as bright red, maroon, or black stools.   (A tablespoon of blood from the rectum is not serious, especially if    hemorrhoids are present.)  6. Vomiting.  7. Weakness or dizziness.  GO DIRECTLY TO THE NEAREST EMERGENCY ROOM IF YOU HAVE ANY OF THE FOLLOWING:      Difficulty breathing              Chills and/or fever over 101 F   Persistent vomiting and/or vomiting blood   Severe abdominal pain   Severe chest pain   Black, tarry stools   Bleeding- more than one tablespoon   Any other symptom or condition that you feel may need urgent attention  Your doctor recommends these additional instructions:  If any biopsies were taken, your doctors clinic will contact you in 1 to 2   weeks with any results.  - Discharge patient to home.   - Resume previous diet.   - Continue present medications.   - Patient has a contact number available for emergencies.  The signs and   symptoms of potential delayed complications were discussed with the   patient.  Return to normal activities tomorrow.  Written discharge   instructions were provided to the patient.   - Repeat colonoscopy in 10 years for screening purposes.  For questions, problems or results please call your physician - Chidi Valdez MD at Work:  [phone].  [facility], EMERGENCY ROOM PHONE NUMBER: [phone]  IF A COMPLICATION OR EMERGENCY SITUATION ARISES AND YOU ARE UNABLE TO REACH   YOUR PHYSICIAN - GO DIRECTLY TO THE EMERGENCY ROOM.  Chidi Valdez MD  4/5/2025 8:01:05 AM  This report has been verified and signed electronically.  Dear patient,  As a result of recent federal legislation (The Federal Cures Act), you may   receive lab or pathology results from your procedure in your [facility]   account before your physician is able to contact you. Your physician or   their representative will relay the results to you with their   recommendations at their soonest availability.  Thank you,  PROVATION

## 2025-04-05 NOTE — H&P
Procedure : Colonoscopy    Indication(s):  asymptomatic screening exam    Last colonoscopy: none    Review of patient's allergies indicates:   Allergen Reactions    Vicodin [hydrocodone-acetaminophen] Shortness Of Breath       Past Medical History:   Diagnosis Date    COURTNEY II (cervical intraepithelial neoplasia II) 2016    KJB---LEEP    CIS (carcinoma in situ of cervix) 2004    Dermoid cyst of ovary 2020    LEFT <1 CM       Prior to Admission medications    Medication Sig Start Date End Date Taking? Authorizing Provider   docusate (COLACE) 50 mg/5 mL liquid Take 50 mg by mouth once daily.    Provider, Historical   vitamin D (VITAMIN D3) 1000 units Tab Take 1,000 Units by mouth once daily.    Provider, Historical       Sedation Problems: NO    Family History   Problem Relation Name Age of Onset    Breast cancer Paternal Aunt      Cancer Father Aditya Egan         Prostate    Hyperlipidemia Mother      Colon cancer Neg Hx      Ovarian cancer Neg Hx         Fam Hx of Sedation Problems: NO    Social History[1]    Review of Systems -     Respiratory ROS: no cough, shortness of breath, or wheezing  Cardiovascular ROS: no chest pain or dyspnea on exertion  Gastrointestinal ROS: no abdominal pain, change in bowel habits, or black or bloody stools  Musculoskeletal ROS: negative  Neurological ROS: no TIA or stroke symptoms        Physical Exam:  General: no distress  Head: normocephalic  Airway:  normal oropharynx, airway normal  Neck: supple, symmetrical, trachea midline  Lungs:  normal respiratory effort  Heart: regular rate and rhythm  Abdomen: soft, non-tender non-distented; bowel sounds normal; no masses,  no organomegaly  Extremities: no cyanosis or edema, or clubbing       Deep Sedation: Mallampati Score per anesthesia     SedationPlan :Moderate     ASA : II    Patient is medically cleared for anesthesia.    Anesthesia/Surgery risks, benefits and alternative options discussed and understood by patient/family.          [1]   Social History  Socioeconomic History    Marital status:    Tobacco Use    Smoking status: Former     Current packs/day: 0.00     Average packs/day: 0.3 packs/day for 5.0 years (1.3 ttl pk-yrs)     Types: Cigarettes     Start date: 1988     Quit date: 1992     Years since quittin.2    Smokeless tobacco: Never   Substance and Sexual Activity    Alcohol use: No    Drug use: No    Sexual activity: Yes     Partners: Male     Birth control/protection: I.U.D.     Social Drivers of Health     Financial Resource Strain: Low Risk  (2024)    Overall Financial Resource Strain (CARDIA)     Difficulty of Paying Living Expenses: Not hard at all   Food Insecurity: No Food Insecurity (2024)    Hunger Vital Sign     Worried About Running Out of Food in the Last Year: Never true     Ran Out of Food in the Last Year: Never true   Transportation Needs: No Transportation Needs (2024)    TRANSPORTATION NEEDS     Transportation : No   Physical Activity: Sufficiently Active (2024)    Exercise Vital Sign     Days of Exercise per Week: 7 days     Minutes of Exercise per Session: 30 min   Stress: Stress Concern Present (2024)    [employer] [place] [employer] - Occupational Stress Questionnaire     Feeling of Stress : To some extent   Housing Stability: Unknown (2024)    Housing Stability Vital Sign     Unable to Pay for Housing in the Last Year: No     Homeless in the Last Year: No

## 2025-04-05 NOTE — TRANSFER OF CARE
"Anesthesia Transfer of Care Note    Patient: Dione Egan    Procedure(s) Performed: Procedure(s) (LRB):  COLONOSCOPY, SCREENING, LOW RISK PATIENT (N/A)    Patient location: PACU    Anesthesia Type: general    Transport from OR: Transported from OR on room air with adequate spontaneous ventilation    Post pain: adequate analgesia    Post assessment: no apparent anesthetic complications and tolerated procedure well    Post vital signs: stable    Level of consciousness: responds to stimulation and sedated    Nausea/Vomiting: no nausea/vomiting    Complications: none    Transfer of care protocol was followed    Last vitals: Visit Vitals  BP (!) 105/55 (BP Location: Left arm, Patient Position: Lying)   Pulse (!) 54   Temp 36.6 °C (97.9 °F) (Temporal)   Resp 16   Ht 5' 4" (1.626 m)   Wt 49.9 kg (110 lb)   SpO2 100%   Breastfeeding No   BMI 18.88 kg/m²     "

## 2025-04-05 NOTE — ANESTHESIA POSTPROCEDURE EVALUATION
Anesthesia Post Evaluation    Patient: Dione Egan    Procedure(s) Performed: Procedure(s) (LRB):  COLONOSCOPY, SCREENING, LOW RISK PATIENT (N/A)    Final Anesthesia Type: general      Patient location during evaluation: PACU  Patient participation: Yes- Able to Participate  Level of consciousness: awake and alert  Post-procedure vital signs: reviewed and stable  Pain management: adequate  Airway patency: patent    PONV status at discharge: No PONV  Anesthetic complications: no      Cardiovascular status: blood pressure returned to baseline  Respiratory status: unassisted, spontaneous ventilation and room air  Hydration status: euvolemic                Vitals Value Taken Time   BP 90/53 04/05/25 08:25   Temp 36.3 °C (97.4 °F) 04/05/25 07:55   Pulse 70 04/05/25 08:25   Resp 20 04/05/25 08:25   SpO2 100 % 04/05/25 08:25         Event Time   Out of Recovery 08:10:00         Pain/Dre Score: Dre Score: 10 (4/5/2025  8:10 AM)

## 2025-04-07 ENCOUNTER — RESULTS FOLLOW-UP (OUTPATIENT)
Dept: PRIMARY CARE CLINIC | Facility: CLINIC | Age: 55
End: 2025-04-07

## 2025-04-07 DIAGNOSIS — Z12.11 SCREENING FOR COLON CANCER: Primary | ICD-10-CM

## 2025-04-07 NOTE — ASSESSMENT & PLAN NOTE
Screening Colonoscopy 4/5/25:  Findings:   --The entire examined colon appeared normal.   Impression:    - The entire examined colon is normal.                          - No specimens collected.     **repeat screening colonoscopy 4/5/2035    Lab Results   Component Value Date    TSH 1.679 11/22/2024   --no clear cause of constipation identified

## 2025-05-29 ENCOUNTER — RESEARCH ENCOUNTER (OUTPATIENT)
Dept: RESEARCH | Facility: HOSPITAL | Age: 55
End: 2025-05-29
Payer: COMMERCIAL

## 2025-05-29 NOTE — PROGRESS NOTES
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 not applicable  Study Personnel Obtaining Consent: Selin Santiago    Per Dr. Adams, I met with the pt in clinic to review the consent form for the GUT MICROBIOME ICF. Pt was accompanied by those mentioned above. The nature of the research was fully explained to the potential subject and each page of the consent form was reviewed with the patient in the presence of those present for discussion. Key information, the purpose of the study, length of the study, procedures, risks, potential benefits, costs, payment for participation and/or reimbursement of expenses; alternative methods/treatments, study-related questions and compensation for injury, rights, contact information, voluntary participation, employees in research, new findings, return of research results, future research, study withdrawal, confidentiality, ClinicalTrials.gov and HIPAA authorization were fully discussed.     The subject was provided with ample time to review the consent, consider participation, and ask questions related to this study. All questions were answered appropriately and to their satisfaction.  Dr. Adams (PI) also met with the patient and answered any additional questions that the pt had. The subject was able to verbalize understanding of the consent form and agreed to participate. The Patient and CRC signed the most recently IRB-approved version of the consent form  [IRB  approval date: 05/22/2024 (ICF V1]   and was provided with a copy of the signed consent form, which includes the PI's contact information. Subject was also provided with the CRC's contact information. The original consent form was uploaded into the electronic medical records (Epic).       PATIENT QUESTIONNAIRE-   Patient must complete in clinic once ICF is signed.   Patient was asked to fill out questionnaire as it pertains to food and activity. Joe Iyer [facility], explained how to complete questionnaire and patient return booklet prior to end of visit.    Did patient receive questionnaire booklet? Yes  Did patient complete questionnaire booklet? Yes    SAMPLE COLLECTION -  Patient was provided with a stool sample kit and instructions on how to use kit for sample collection. [facility]:[id number]} discussed with patient about storing the samples and when to return sample to clinic. The patient and CRC agreed that the sample is to be returned on: TBD

## 2025-06-03 ENCOUNTER — RESEARCH ENCOUNTER (OUTPATIENT)
Dept: RESEARCH | Facility: HOSPITAL | Age: 55
End: 2025-06-03
Payer: COMMERCIAL

## 2025-06-09 ENCOUNTER — OFFICE VISIT (OUTPATIENT)
Dept: OBSTETRICS AND GYNECOLOGY | Facility: CLINIC | Age: 55
End: 2025-06-09
Payer: COMMERCIAL

## 2025-06-09 VITALS
WEIGHT: 109.88 LBS | BODY MASS INDEX: 18.76 KG/M2 | HEIGHT: 64 IN | DIASTOLIC BLOOD PRESSURE: 64 MMHG | SYSTOLIC BLOOD PRESSURE: 100 MMHG

## 2025-06-09 DIAGNOSIS — Z12.31 ENCOUNTER FOR SCREENING MAMMOGRAM FOR MALIGNANT NEOPLASM OF BREAST: ICD-10-CM

## 2025-06-09 DIAGNOSIS — Z01.419 ENCOUNTER FOR WELL WOMAN EXAM: Primary | ICD-10-CM

## 2025-06-09 DIAGNOSIS — Z30.432 ENCOUNTER FOR IUD REMOVAL: ICD-10-CM

## 2025-06-09 DIAGNOSIS — Z12.4 SCREENING FOR CERVICAL CANCER: ICD-10-CM

## 2025-06-09 DIAGNOSIS — Z11.51 SCREENING FOR HPV (HUMAN PAPILLOMAVIRUS): ICD-10-CM

## 2025-06-09 PROCEDURE — 3008F BODY MASS INDEX DOCD: CPT | Mod: CPTII,S$GLB,, | Performed by: STUDENT IN AN ORGANIZED HEALTH CARE EDUCATION/TRAINING PROGRAM

## 2025-06-09 PROCEDURE — 3074F SYST BP LT 130 MM HG: CPT | Mod: CPTII,S$GLB,, | Performed by: STUDENT IN AN ORGANIZED HEALTH CARE EDUCATION/TRAINING PROGRAM

## 2025-06-09 PROCEDURE — 99999 PR PBB SHADOW E&M-EST. PATIENT-LVL III: CPT | Mod: PBBFAC,,, | Performed by: STUDENT IN AN ORGANIZED HEALTH CARE EDUCATION/TRAINING PROGRAM

## 2025-06-09 PROCEDURE — 3078F DIAST BP <80 MM HG: CPT | Mod: CPTII,S$GLB,, | Performed by: STUDENT IN AN ORGANIZED HEALTH CARE EDUCATION/TRAINING PROGRAM

## 2025-06-09 PROCEDURE — 88175 CYTOPATH C/V AUTO FLUID REDO: CPT | Mod: TC | Performed by: STUDENT IN AN ORGANIZED HEALTH CARE EDUCATION/TRAINING PROGRAM

## 2025-06-09 PROCEDURE — 58301 REMOVE INTRAUTERINE DEVICE: CPT | Mod: S$GLB,,, | Performed by: STUDENT IN AN ORGANIZED HEALTH CARE EDUCATION/TRAINING PROGRAM

## 2025-06-09 PROCEDURE — 99396 PREV VISIT EST AGE 40-64: CPT | Mod: 25,S$GLB,, | Performed by: STUDENT IN AN ORGANIZED HEALTH CARE EDUCATION/TRAINING PROGRAM

## 2025-06-09 PROCEDURE — 87624 HPV HI-RISK TYP POOLED RSLT: CPT | Performed by: STUDENT IN AN ORGANIZED HEALTH CARE EDUCATION/TRAINING PROGRAM

## 2025-06-09 PROCEDURE — 1159F MED LIST DOCD IN RCRD: CPT | Mod: CPTII,S$GLB,, | Performed by: STUDENT IN AN ORGANIZED HEALTH CARE EDUCATION/TRAINING PROGRAM

## 2025-06-09 NOTE — PROGRESS NOTES
Removal of IUD    Date/Time: 6/9/2025 1:15 PM    Performed by: Sissy Wilhelm MD  Authorized by: Sissy Wilhelm MD    Consent obtained:  Prior to procedure the appropriate consent was completed and verified  Consent given by:  Patient  Procedure risks and benefits discussed: yes    Patient questions answered: yes    Patient agrees, verbalizes understanding, and wants to proceed: yes    Patient states understanding of procedure being performed: yes    Implant grasped by: ring forceps  Removed with no complications: yes     Procedure explained. Consent forms reviewed and signed, pt was queried and without questions. Female chaperone present.    Consent obtained prior to procedure.  Strings visualized at cervix.  Strings grasped and IUD removed without difficulty. The IUD was visualized in whole by myself and the patient and then was discarded.  Patient tolerated procedure well.  No complications.  Care instructions reviewed.

## 2025-06-09 NOTE — PROGRESS NOTES
"OBSTETRICS AND GYNECOLOGY      Chief Complaint:  Well Woman Exam     HPI:      Dione Egan is a 54 y.o.  who presents today for well woman exam.    LMP: No LMP recorded. Patient denies abnormal discharge/odor, pelvic pain. Did have one episode of postcoital spotting.  Ms. Egan is currently sexually active with a single male partner.  She declines STD screening today.  She denies additional acute issues, problems, or complaints.    Past Medical History:   Diagnosis Date    COURTNEY II (cervical intraepithelial neoplasia II)     KJB---LEEP    CIS (carcinoma in situ of cervix)     Dermoid cyst of ovary     LEFT <1 CM        OB History          3    Para   2    Term   2            AB        Living             SAB        IAB        Ectopic        Multiple        Live Births                     ROS:     GENERAL: Feeling well overall.   BREASTS: Denies breast skin changes, lumps or nipple discharge.      Physical Exam:      PHYSICAL EXAM:  /64 (BP Location: Right arm, Patient Position: Sitting)   Ht 5' 4" (1.626 m)   Wt 49.9 kg (109 lb 14.4 oz)   BMI 18.86 kg/m²   Body mass index is 18.86 kg/m².     APPEARANCE:  Well nourished, well developed, in no acute distress.  Able to smile appropriately during our encounter. Makes eye contact. Pleasant.  PSYCH: Appropriate mood and affect.  SKIN:   No acne or hirsutism.  CARDIOVASCULAR:  No edema of peripheral extremities. Well perfused throughout.  RESP:  No accessory muscle use to breathe. Speaking comfortably in complete sentences.   BREASTS:  Symmetrical, with no visible skin lesions or scars, no palpable masses. No nipple discharge or peau d'orange bilaterally. No palpable axillary LAD.  ABDOMEN:  Soft. Nonacute.  PELVIC:  Normal external genitalia without lesions. Normal hair distribution. Adequate perineal body, normal urethral meatus. Vagina moist and well rugated. Without lesions, without discharge. Cervix 3x4cm, parous. Cervix pink, " without ectocervical lesions, discharge or tenderness.  No ectropion. No significant cystocele or rectocele.  Bimanual exam shows uterus to be normal size, regular, mobile and nontender.  Adnexa without masses or tenderness.  IUD strings visible. See procedure note. IUD removed without complication.     Female chaperone present.    Assessment/Plan:     -- Counseled patient regarding healthy diet and regular exercise, daily seat belt use.   -- BP normotensive  -- She denies abuse and feels safe at home.  -- Pap smear:  NILM, HPV(-) 11/2023  -- IUD removed, per patient request, without complication  See procedure note  -- STD screening:  declines   -- MMG:  schedule  -- Labs, reviewed today:  TSH WNL 11/2024    Follow up in about 1 year (around 6/9/2026) for WWE.    Counseling:     Patient was counseled today on current ASCCP pap guidelines, the recommendation for yearly physical exams, safe driving habits, breast self awareness and annual mammograms. She is to see her PCP for other health maintenance.     Use of the [facility] Patient Portal discussed and encouraged during today's visit.                 As of April 1, 2021, the Cures Act has been passed nationally. This new law requires that all doctors progress notes, lab results, pathology reports and radiology reports be released IMMEDIATELY to the patient in the patient portal. That means that the results are released to you at the EXACT same time they are released to me. Therefore, with all of the patients that I have I am not able to reply to each patient exactly when the results come in. So there will be a delay from when you see the results to when I see them and have time to come up with a response to send you. Also I only see these results when I am on the computer at work. So if the results come in over the weekend or after 5 pm of a work day, I will not see them until the next business day. As you can tell, this is a challenge as a physician to give every  patient the quick response they hope for and deserve. So please be patient!   Thanks for your understanding and patience.

## 2025-06-12 LAB
INSULIN SERPL-ACNC: NORMAL U[IU]/ML
LAB AP BETHESDA CATEGORY: NORMAL
LAB AP CLINICAL FINDINGS: NORMAL
LAB AP CONTRACEPTIVES: NORMAL
LAB AP OCHS PAP SPECIMEN ADEQUACY: NORMAL
LAB AP OHS PAP INTERPRETATION: NORMAL
LAB AP PAP DISCLAIMER COMMENTS: NORMAL
LAB AP PAP ESTROGEN REPLACEMENT THERAPY: NORMAL
LAB AP PAP PMP: NORMAL
LAB AP PAP PREVIOUS BX: NORMAL
LAB AP PAP PRIOR TREATMENT: NORMAL
LAB AP PERFORMING LOCATION(S): NORMAL

## 2025-06-13 ENCOUNTER — RESULTS FOLLOW-UP (OUTPATIENT)
Dept: OBSTETRICS AND GYNECOLOGY | Facility: CLINIC | Age: 55
End: 2025-06-13

## 2025-06-13 LAB
HPV DNA, HIGH RISK TYPE 16, PCR (OHS): NEGATIVE
HPV DNA, HIGH RISK TYPE 18, PCR (OHS): NEGATIVE
HPV DNA, HIGH RISK TYPE OTHER, PCR (OHS): NEGATIVE

## 2025-06-17 ENCOUNTER — OFFICE VISIT (OUTPATIENT)
Dept: GASTROENTEROLOGY | Facility: CLINIC | Age: 55
End: 2025-06-17
Payer: COMMERCIAL

## 2025-06-17 ENCOUNTER — HOSPITAL ENCOUNTER (OUTPATIENT)
Dept: RADIOLOGY | Facility: HOSPITAL | Age: 55
Discharge: HOME | End: 2025-06-17
Payer: COMMERCIAL

## 2025-06-17 VITALS
DIASTOLIC BLOOD PRESSURE: 64 MMHG | BODY MASS INDEX: 18.78 KG/M2 | HEIGHT: 64 IN | SYSTOLIC BLOOD PRESSURE: 100 MMHG | HEART RATE: 58 BPM | WEIGHT: 110 LBS

## 2025-06-17 DIAGNOSIS — R14.0 ABDOMINAL BLOATING: ICD-10-CM

## 2025-06-17 DIAGNOSIS — K58.1 IRRITABLE BOWEL SYNDROME WITH CONSTIPATION: ICD-10-CM

## 2025-06-17 DIAGNOSIS — K58.1 IRRITABLE BOWEL SYNDROME WITH CONSTIPATION: Primary | ICD-10-CM

## 2025-06-17 PROCEDURE — 74018 RADEX ABDOMEN 1 VIEW: CPT | Mod: 26,,, | Performed by: RADIOLOGY

## 2025-06-17 PROCEDURE — 3078F DIAST BP <80 MM HG: CPT | Mod: CPTII,S$GLB,,

## 2025-06-17 PROCEDURE — 99204 OFFICE O/P NEW MOD 45 MIN: CPT | Mod: S$GLB,,,

## 2025-06-17 PROCEDURE — 3008F BODY MASS INDEX DOCD: CPT | Mod: CPTII,S$GLB,,

## 2025-06-17 PROCEDURE — 3074F SYST BP LT 130 MM HG: CPT | Mod: CPTII,S$GLB,,

## 2025-06-17 PROCEDURE — 1160F RVW MEDS BY RX/DR IN RCRD: CPT | Mod: CPTII,S$GLB,,

## 2025-06-17 PROCEDURE — 74018 RADEX ABDOMEN 1 VIEW: CPT | Mod: TC

## 2025-06-17 PROCEDURE — 1159F MED LIST DOCD IN RCRD: CPT | Mod: CPTII,S$GLB,,

## 2025-06-17 PROCEDURE — 99999 PR PBB SHADOW E&M-EST. PATIENT-LVL IV: CPT | Mod: PBBFAC,,,

## 2025-06-17 NOTE — PROGRESS NOTES
Gastroenterology Clinic Consultation Note    Patient ID: Dione Egan is a 54 y.o. female.    Chief Complaint: Bloated and IBS-C     History of Present Illness    CHIEF COMPLAINT:  Patient presents today for GI issues.    GASTROINTESTINAL SYMPTOMS:  She reports feeling bloated and nauseous after eating, with notable discomfort after consuming home fried fish recently. She describes feeling distended with a sensation of disconnect between her abdomen and intestines. Despite having daily bowel movements (3-5 times after breakfast), she reports constipation. Her stools are described as thin, long, and light brown in color. She experiences stress-induced diarrhea that can occur anytime, particularly during stressful life events.    DIET AND LIFESTYLE:  Over the past two years, she has made significant dietary modifications including reduced gluten intake (limited to self-made morning bread) and limited dairy (avoiding milk and cheese, except plain yogurt). She reports intolerance to certain vegetables which she has avoided for years. She expresses uncertainty about food choices and maintains a strict diet. She exercises daily, takes stairs at work, and drinks approximately 8 glasses of water daily.    RECENT INTERVENTIONS:  She recently took docusate sodium and multiple doses of milk of magnesia. She has undergone recent colonoscopy and IUD removal.      ROS:  General: -fever, -chills, -fatigue, -weight gain, -weight loss  Eyes: -vision changes, -redness, -discharge  ENT: -ear pain, -nasal congestion, -sore throat  Cardiovascular: -chest pain, -palpitations, -lower extremity edema  Respiratory: -cough, -shortness of breath  Gastrointestinal: -abdominal pain, +nausea, -vomiting, -diarrhea, +constipation, -blood in stool, +bloating, +abdominal distention, +indigestion  Genitourinary: -dysuria, -hematuria, -frequency  Musculoskeletal: -joint pain, -muscle pain  Skin: -rash, -lesion  Neurological: -headache, -dizziness,  -numbness, -tingling  Psychiatric: -anxiety, -depression, -sleep difficulty         Physical Exam  Vitals and nursing note reviewed.   Constitutional:       General: She is not in acute distress.     Appearance: Normal appearance. She is not ill-appearing.   HENT:      Head: Normocephalic and atraumatic.      Right Ear: External ear normal.      Left Ear: External ear normal.      Nose: Nose normal.   Eyes:      General: No scleral icterus.     Extraocular Movements: Extraocular movements intact.   Cardiovascular:      Rate and Rhythm: Normal rate.   Pulmonary:      Effort: Pulmonary effort is normal. No respiratory distress.   Abdominal:      General: There is no distension.      Palpations: Abdomen is soft.      Tenderness: There is no guarding.   Musculoskeletal:         General: Normal range of motion.      Cervical back: Normal range of motion.   Skin:     General: Skin is warm.   Neurological:      Mental Status: She is alert and oriented to person, place, and time.   Psychiatric:         Mood and Affect: Mood normal.         Behavior: Behavior is cooperative.         Thought Content: Thought content normal.         Medical History:  has a past medical history of COURTNEY II (cervical intraepithelial neoplasia II) (2016), CIS (carcinoma in situ of cervix) (2004), and Dermoid cyst of ovary (2020).    Surgical History:  has a past surgical history that includes Cervical conization w/ laser (2006); Intrauterine device insertion (2015/2020); Cervical biopsy w/ loop electrode excision (2016); Removal of intrauterine device (IUD) (2020); and colonoscopy, screening, low risk patient (N/A, 4/5/2025).    Family History: family history includes Breast cancer in her paternal aunt; Cancer in her father; Hyperlipidemia in her mother..       Review of patient's allergies indicates:   Allergen Reactions    Vicodin [hydrocodone-acetaminophen] Shortness Of Breath       Medications Ordered Prior to Encounter[1]    Labs:  Lab Results  "  Component Value Date    WBC 3.80 (L) 08/17/2023    HGB 12.6 08/17/2023    HCT 38.3 08/17/2023     08/17/2023    CHOL 215 (H) 11/22/2024    TRIG 59 11/22/2024    HDL 62 11/22/2024    ALKPHOS 55 08/17/2023    ALT 12 08/17/2023    AST 18 08/17/2023     08/17/2023    K 4.0 08/17/2023     08/17/2023    CREATININE 0.8 08/17/2023    BUN 20 08/17/2023    CO2 26 08/17/2023    TSH 1.679 11/22/2024    HGBA1C 5.1 08/17/2023       Vital Signs:  /64 (BP Location: Left arm, Patient Position: Sitting)   Pulse (!) 58   Ht 5' 4" (1.626 m)   Wt 49.9 kg (110 lb 0.2 oz)   BMI 18.88 kg/m²   Body mass index is 18.88 kg/m².    Imaging reviewed: NA       Endoscopy reviewed:   Findings:       The entire examined colon appeared normal.   Impression:            - The entire examined colon is normal.                          - No specimens collected.   Recommendation:        - Discharge patient to home.                          - Resume previous diet.                          - Continue present medications.                          - Patient has a contact number available for                          emergencies. The signs and symptoms of potential                          delayed complications were discussed with the                          patient. Return to normal activities tomorrow.                          Written discharge instructions were provided to                          the patient.                          - Repeat colonoscopy in 10 years for screening                          purposes.   Attending Participation:        I personally performed the entire procedure.   Chidi Valdez MD   4/5/2025 8:01:05 AM       Assessment:  1. Irritable bowel syndrome with constipation    2. Abdominal bloating      Orders Placed This Encounter    X-Ray Abdomen AP 1 View       Assessment & Plan      IRRITABLE BOWEL SYNDROME (IBS):  - Suspect irritable bowel syndrome (IBS) based on symptoms of bloating, abdominal " discomfort, food sensitivities, and normal colonoscopy results.  - Consider potential constipation component despite daily bowel movements.  - Explained IBS as a functional disorder with gut-brain interaction.  - Discussed how stress can trigger IBS flares.  - Started MiraLAX nightly before bed for IBS symptom management.  - Started OTC IB Guard for IBS symptom management.  - Ordered XR Abdomen to assess stool burden and determine extent of constipation.    LIFESTYLE RECOMMENDATIONS:  - Patient to continue current water intake of 8-10 glasses per day.  - Patient to maintain current exercise routine.    DIETARY MANAGEMENT:  - Referred to GI dietitian for FODMAP diet guidance and virtual visits.    FOLLOW-UP:  - Follow up after XR results to discuss findings and adjust treatment plan as needed.          Plan Constipation:   Start daily fiber. Take 1 tsp of fiber powder (psyllium or other sugar-free powder).  Mix in 8 oz of water.  Take x 3-5 days.  Then, increase fiber by 1 tsp every 3-5 days until stool is easy to pass.  Stop and continue at that dose.   Do not exceed 6 tsps/day. GOAL:  More well-formed stool (one continuous well-formed piece vs. Pellets) and minimize straining with initiation. Can cause increased gas.   Start miralax daily (17g per dose). Start at once per day and can titrate up to twice daily. Decrease and/or stop Miralax if stools become softer/liquid in consistency.   Increase water intake to 8-10 glasses of water per day  Stay active. 30-45 minutes of moderate activity 3-4 times per week. (Mobility=motility)     Plan bloating:   OTC medications for bloating: Gas-X, Gaviscon, CharcoCaps, IB Ghanshyam, Simethicone    Diet: Eat and drink more slowly to swallow less air. Limit fatty and spicy foods. Avoid caffeine, carbonated drinks, and artificial sweeteners. Avoid common gas-causing foods, such as beans, peas, lentils, cabbage, onions, broccoli, cauliflower, and whole grains. Try removing one food at a  time from your diet to see if your gas improves.  Fiber: Fiber has many benefits, although too much fiber may increase the amount of gas in your intestines.  Exercise: Regular daily exercise often reduces symptoms in the stomach and intestines.  Laxatives: Over-the-counter laxatives, such as polyethylene glycol (one brand: Miralax), may help with constipation but probably not with stomach pain.  Antidiarrheal medicines: Over-the-counter loperamide may help with diarrhea but probably not with stomach pain.  Probiotics: Probiotics are found in some over-the-counter supplements and yogurts. Common probiotics are Lactobacillus and Bifidobacterium.       [address]  Gastroenterology Department  [facility] [address] Office [phone]     This note was generated with the assistance of ambient listening technology. Verbal consent was obtained by the patient and accompanying visitor(s) for the recording of patient appointment to facilitate this note. I attest to having reviewed and edited the generated note for accuracy, though some syntax or spelling errors may persist. Please contact the author of this note for any clarification.                      [1]   Current Outpatient Medications on File Prior to Visit   Medication Sig Dispense Refill    docusate (COLACE) 50 mg/5 mL liquid Take 50 mg by mouth once daily.      vitamin D (VITAMIN D3) 1000 units Tab Take 1,000 Units by mouth once daily.       Current Facility-Administered Medications on File Prior to Visit   Medication Dose Route Frequency Provider Last Rate Last Admin    DIPH,PERTUSS(ACEL),TET VAC(PF)(ADULT)(ADACEL)(TDaP)  0.5 mL Intramuscular vaccine x 1 dose         levonorgestreL 20 mcg/24 hours (5 yrs) 52 mg IUD 1 Intra Uterine Device  1 Intra Uterine Device Intrauterine  Daljit Kinney Jr., MD   1 Intra Uterine Device at 08/24/20 1300

## 2025-06-18 ENCOUNTER — PATIENT MESSAGE (OUTPATIENT)
Dept: GASTROENTEROLOGY | Facility: CLINIC | Age: 55
End: 2025-06-18
Payer: COMMERCIAL